# Patient Record
Sex: MALE | Race: OTHER | HISPANIC OR LATINO | Employment: FULL TIME | URBAN - METROPOLITAN AREA
[De-identification: names, ages, dates, MRNs, and addresses within clinical notes are randomized per-mention and may not be internally consistent; named-entity substitution may affect disease eponyms.]

---

## 2023-06-07 ENCOUNTER — APPOINTMENT (EMERGENCY)
Dept: RADIOLOGY | Facility: HOSPITAL | Age: 53
End: 2023-06-07
Payer: OTHER MISCELLANEOUS

## 2023-06-07 ENCOUNTER — APPOINTMENT (EMERGENCY)
Dept: NON INVASIVE DIAGNOSTICS | Facility: HOSPITAL | Age: 53
End: 2023-06-07
Payer: OTHER MISCELLANEOUS

## 2023-06-07 ENCOUNTER — HOSPITAL ENCOUNTER (EMERGENCY)
Facility: HOSPITAL | Age: 53
Discharge: HOME | End: 2023-06-07
Attending: EMERGENCY MEDICINE
Payer: OTHER MISCELLANEOUS

## 2023-06-07 VITALS
SYSTOLIC BLOOD PRESSURE: 148 MMHG | DIASTOLIC BLOOD PRESSURE: 91 MMHG | TEMPERATURE: 98.2 F | RESPIRATION RATE: 18 BRPM | HEART RATE: 84 BPM | OXYGEN SATURATION: 98 %

## 2023-06-07 DIAGNOSIS — S80.811A ABRASION, RIGHT LOWER LEG, INITIAL ENCOUNTER: ICD-10-CM

## 2023-06-07 DIAGNOSIS — S80.11XA CONTUSION OF RIGHT LOWER LEG, INITIAL ENCOUNTER: Primary | ICD-10-CM

## 2023-06-07 PROCEDURE — 93971 EXTREMITY STUDY: CPT | Performed by: SURGERY

## 2023-06-07 PROCEDURE — 73590 X-RAY EXAM OF LOWER LEG: CPT

## 2023-06-07 PROCEDURE — 90715 TDAP VACCINE 7 YRS/> IM: CPT | Performed by: EMERGENCY MEDICINE

## 2023-06-07 PROCEDURE — 73564 X-RAY EXAM KNEE 4 OR MORE: CPT

## 2023-06-07 PROCEDURE — 93971 EXTREMITY STUDY: CPT

## 2023-06-07 RX ORDER — NAPROXEN 500 MG/1
500 TABLET ORAL EVERY 12 HOURS PRN
Qty: 15 TABLET | Refills: 0 | Status: SHIPPED | OUTPATIENT
Start: 2023-06-07

## 2023-06-07 RX ORDER — KETOROLAC TROMETHAMINE 30 MG/ML
30 INJECTION, SOLUTION INTRAMUSCULAR; INTRAVENOUS ONCE
Status: COMPLETED | OUTPATIENT
Start: 2023-06-07 | End: 2023-06-07

## 2023-06-07 RX ADMIN — TETANUS TOXOID, REDUCED DIPHTHERIA TOXOID AND ACELLULAR PERTUSSIS VACCINE, ADSORBED 0.5 ML: 5; 2.5; 8; 8; 2.5 SUSPENSION INTRAMUSCULAR at 13:24

## 2023-06-07 RX ADMIN — KETOROLAC TROMETHAMINE 30 MG: 30 INJECTION, SOLUTION INTRAMUSCULAR; INTRAVENOUS at 13:23

## 2023-06-07 NOTE — ED PROVIDER NOTES
History  Chief Complaint   Patient presents with   • Leg Pain     Worsening pain to RLE due to injury at work  No pain meds pta       64-year-old male with no past medical history presents to the ED with worsening pain to the right leg below the knee as well as swelling  Patient injured himself at work about 10 days ago  He states he lost his balance and his leg fell between 2 metal objects  He has been ambulatory with pain  He has been taking over-the-counter remedies without relief  Has noticed increased swelling of the calf over the last few days  History provided by:  Patient   used: No    Leg Pain  Location:  Leg  Time since incident:  10 days  Injury: yes    Mechanism of injury: fall    Fall:     Fall occurred:  Walking    Impact surface: Leg fell between 2 metal objects  Leg location:  R lower leg  Pain details:     Onset quality:  Gradual    Duration:  10 days    Timing:  Constant    Progression:  Worsening  Chronicity:  New  Tetanus status:  Unknown  Prior injury to area:  No  Worsened by:  Bearing weight  Ineffective treatments:  Acetaminophen and NSAIDs  Associated symptoms: swelling    Associated symptoms: no decreased ROM, no fever, no muscle weakness and no neck pain        None       Past Medical History:   Diagnosis Date   • [facility] ([facility]        History reviewed  No pertinent surgical history  History reviewed  No pertinent family history  I have reviewed and agree with the history as documented  E-Cigarette/Vaping     E-Cigarette/Vaping Substances     Social History     Tobacco Use   • Smoking status: Never   • Smokeless tobacco: Never   Substance Use Topics   • Alcohol use: Yes   • Drug use: Never       Review of Systems   Constitutional: Negative  Negative for fever  HENT: Negative  Eyes: Negative  Respiratory: Negative  Cardiovascular: Negative  Gastrointestinal: Negative  Genitourinary: Negative      Musculoskeletal: Negative for neck pain  Skin: Negative  Allergic/Immunologic: Negative  Neurological: Negative  Negative for weakness, numbness and headaches  Hematological: Negative  Psychiatric/Behavioral: Negative  All other systems reviewed and are negative  Physical Exam  Physical Exam  Vitals and nursing note reviewed  Constitutional:       General: He is awake  He is not in acute distress  Appearance: Normal appearance  He is well-developed and normal weight  He is not ill-appearing or diaphoretic  HENT:      Head: Normocephalic and atraumatic  Right Ear: External ear normal       Left Ear: External ear normal       Nose: Nose normal       Mouth/Throat:      Mouth: Mucous membranes are moist    Eyes:      General: No scleral icterus  Conjunctiva/sclera: Conjunctivae normal    Neck:      Thyroid: No thyromegaly  Vascular: No JVD  Cardiovascular:      Rate and Rhythm: Normal rate and regular rhythm  Heart sounds: Normal heart sounds  No murmur heard  No gallop  Pulmonary:      Effort: Pulmonary effort is normal  No respiratory distress  Breath sounds: Normal breath sounds  No stridor  No wheezing, rhonchi or rales  Musculoskeletal:         General: Swelling, tenderness and signs of injury present  No deformity  Normal range of motion  Right lower leg: No edema  Left lower leg: No edema  Legs:    Skin:     General: Skin is warm and dry  Coloration: Skin is not jaundiced or pale  Findings: No bruising, erythema, lesion or rash  Neurological:      General: No focal deficit present  Mental Status: He is alert and oriented to person, place, and time  Motor: No weakness  Deep Tendon Reflexes: Reflexes are normal and symmetric  Psychiatric:         Mood and Affect: Mood normal          Behavior: Behavior is cooperative           Vital Signs  ED Triage Vitals   Temperature Pulse Respirations Blood Pressure SpO2   06/07/23 1228 06/07/23 1228 06/07/23 1228 06/07/23 1228 06/07/23 1228   98 2 °F (36 8 °C) 76 18 169/88 98 %      Temp Source Heart Rate Source Patient Position - Orthostatic VS BP Location FiO2 (%)   06/07/23 1228 -- 06/07/23 1228 06/07/23 1228 --   Oral  Sitting Right arm       Pain Score       06/07/23 1323       5           Vitals:    06/07/23 1228   BP: 169/88   Pulse: 76   Patient Position - Orthostatic VS: Sitting         Visual Acuity      ED Medications  Medications   ketorolac (TORADOL) injection 30 mg (30 mg Intramuscular Given 6/7/23 1323)   tetanus-diphtheria-acellular pertussis (BOOSTRIX) IM injection 0 5 mL (0 5 mL Intramuscular Given 6/7/23 1324)       Diagnostic Studies  Results Reviewed     None                 XR tibia fibula 2 views RIGHT   ED Interpretation by Gianna Rodarte DO (06/07 1322)   No fracture      Final Result by Steve Shah MD (06/07 1512)      No acute osseous abnormality  Workstation performed: [id number]         XR knee 4+ views Right injury   ED Interpretation by Gianna Rodarte DO (06/07 1322)   Possible avulsion fracture      Final Result by Steve Shah MD (06/07 1512)      No acute osseous abnormality  Degenerative changes as described  Workstation performed: [id number]         VAS lower limb venous duplex study, unilateral/limited    (Results Pending)              Procedures  Procedures         ED Course                               SBIRT 20yo+    Flowsheet Row Most Recent Value   Initial Alcohol Screen: US AUDIT-C     1  How often do you have a drink containing alcohol? 1 Filed at: 06/07/2023 1330   2  How many drinks containing alcohol do you have on a typical day you are drinking? 1 Filed at: 06/07/2023 1330   3a  Male UNDER 65: How often do you have five or more drinks on one occasion? 0 Filed at: 06/07/2023 1330   3b  FEMALE Any Age, or MALE 65+: How often do you have 4 or more drinks on one occassion?  0 Filed at: 06/07/2023 1330   Audit-C Score 2 Filed at: 06/07/2023 8030   HERBIE: How many times in the past year have you    Used an illegal drug or used a prescription medication for non-medical reasons? Never Filed at: 06/07/2023 5940                    Medical Decision Making  52-year-old male presents to the ED with worsening pain and swelling to the right lower extremity below the knee  Patient had an injury at work 10 days ago where he lost his balance and his leg slipped between 2 metal objects  He states he has been ambulatory with pain but the pain is getting worse as well as the swelling  On exam he is alert no acute distress  He has healing abrasions to the anterior right leg below the knee with no signs of infection  He does have swelling of the right calf compared to the left  He has tenderness over the calf as well as over the tibia and knee  No knee effusion  Will obtain x-rays of the right knee and right tib-fib as well as venous duplex of the right lower extremity to rule out DVT  We will also update tetanus and provide Toradol for pain    Amount and/or Complexity of Data Reviewed  Radiology: ordered and independent interpretation performed  Details: X-ray right knee: Possible tiny avulsion fracture possibly from tibial plateau  Awaiting final read  Venous duplex negative for DVT  Final read on x-ray of right knee reveals no fracture      Risk  Prescription drug management            Disposition  Final diagnoses:   Contusion of right lower leg, initial encounter   Abrasion, right lower leg, initial encounter     Time reflects when diagnosis was documented in both MDM as applicable and the Disposition within this note     Time User Action Codes Description Comment    6/7/2023  3:35 PM Lissett Cronin Add [S80 11XA] Contusion of right lower leg, initial encounter     6/7/2023  3:35 PM Lissett Cronin Add [S80 811A] Abrasion, right lower leg, initial encounter       ED Disposition     ED Disposition   Discharge Condition   Good    Date/Time   Wed Jun 7, 2023  3:35 PM    [address]senia Allen Said discharge to home/self care  Follow-up Information     Follow up With Specialties Details Why Contact Info Additional [address] [facility] Schedule an appointment as soon as possible for a visit in 2 days If symptoms worsen [address], [address] [facility] [id number]  [address], [address], [address] [place], [place], [address]          Patient's Medications   Discharge Prescriptions    NAPROXEN (NAPROSYN) 500 MG TABLET    Take 1 tablet (500 mg total) by mouth every 12 (twelve) hours as needed for moderate pain or mild pain       Start Date: 6/7/2023  End Date: --       Order Dose: 500 mg       Quantity: 15 tablet    Refills: 0       No discharge procedures on file      PDMP Review     None          ED Provider  Electronically Signed by           Chery Henderson DO  06/07/23 4461

## 2023-06-07 NOTE — Clinical Note
Terrence Mccray was seen and treated in our emergency department on 6/7/2023  No restrictions            Diagnosis:     Yessi Naqvi  may return to work on return date  He may return on this date: 06/10/2023         If you have any questions or concerns, please don't hesitate to call        Kailyn Whittington DO    ______________________________           _______________          _______________  Hospital Representative                              Date                                Time

## 2023-06-14 ENCOUNTER — APPOINTMENT (OUTPATIENT)
Dept: URGENT CARE | Facility: MEDICAL CENTER | Age: 53
End: 2023-06-14
Payer: OTHER MISCELLANEOUS

## 2023-06-14 PROCEDURE — 99214 OFFICE O/P EST MOD 30 MIN: CPT

## 2023-07-24 ENCOUNTER — APPOINTMENT (OUTPATIENT)
Dept: URGENT CARE | Facility: MEDICAL CENTER | Age: 53
End: 2023-07-24
Payer: OTHER MISCELLANEOUS

## 2023-07-24 PROCEDURE — 99213 OFFICE O/P EST LOW 20 MIN: CPT | Performed by: FAMILY MEDICINE

## 2023-08-07 ENCOUNTER — EVALUATION (OUTPATIENT)
Dept: PHYSICAL THERAPY | Facility: MEDICAL CENTER | Age: 53
End: 2023-08-07
Payer: OTHER MISCELLANEOUS

## 2023-08-07 DIAGNOSIS — M25.571 ACUTE RIGHT ANKLE PAIN: Primary | ICD-10-CM

## 2023-08-07 PROCEDURE — 97161 PT EVAL LOW COMPLEX 20 MIN: CPT | Performed by: PHYSICAL THERAPIST

## 2023-08-08 NOTE — PROGRESS NOTES
PT Evaluation     Today's date: 2023  Patient name: Lucy Siddiqui  : 1970  MRN: [id number]  Referring provider: Luz Maria Munroe MD  Dx:   Encounter Diagnosis     ICD-10-CM    1. Acute right ankle pain  M25.571                      Assessment  Assessment details: Lucy Siddiqui is a 47 y.o. male was evaluated on 2023  for Acute right ankle pain  (primary encounter diagnosis). Lucy Siddiqui has the above listed impairments resulting in functional deficits and negative impact to quality of life. Patient is appropriate for skilled PT intervention to promote maximal return to function and patient specific goals. Patient agrees with outlined treatment plan and all questions were answered to their satisfaction. Impairments: abnormal muscle firing, abnormal muscle tone, abnormal or restricted ROM, impaired physical strength, lacks appropriate home exercise program and pain with function  Understanding of Dx/Px/POC: good   Prognosis: good    Goals  Patient will successfully transition to home exercise program.  Patient will be able to manage symptoms independently. Hilaryturner Howellrosa mariafatou will report no limitation in walking tolerance of 15 minutes or more  Gerdys will be able to stand for an hour without limitation     Plan  Patient would benefit from: skilled PT  Referral necessary: No  Planned modality interventions: thermotherapy: hydrocollator packs  Planned therapy interventions: home exercise program, manual therapy, neuromuscular re-education, patient education, functional ROM exercises, strengthening, stretching, joint mobilization, graded activity, graded exercise, therapeutic exercise, body mechanics training, motor coordination training and activity modification  Frequency: 1x week  Duration in weeks: 12  Treatment plan discussed with: patient        Subjective Evaluation    History of Present Illness  Mechanism of injury: Lucy Siddiqui is a 47 y.o. Jazmyn Verma male presenting to therapy with right lower leg/ankle pain after having his foot fall in between two metal objects at work. He notes that it was a struggle to get his ankle out of the tight space. Had imaging performed at ER and was negative for fracture. He currently is wearing no bracing. Notes that he is limited in walking due to swelling and pain that begin with increased time on his feet. He has not returned to work as it requires a good deal of standing and walking. Doppler for DVT (-)     Patient Goals  Patient goals for therapy: decreased pain, increased motion, return to work, return to [employer] activities, independence with ADLs/IADLs and increased strength    Pain  Current pain rating: 3  At best pain ratin  At worst pain ratin  Quality: dull ache, tight, squeezing and discomfort          Objective     Observations     Additional Observation Details  Mild swelling in R lower ankle, no pitting edema        Palpation     Right   Tenderness of the medial gastrocnemius and soleus. Trigger point to medial gastrocnemius and soleus. Additional Palpation Details  Mild bruising to medial gastroc     Neurological Testing     Sensation     Ankle/Foot   Left Ankle/Foot   Intact: light touch    Right Ankle/Foot   Intact: light touch     Active Range of Motion   Left Ankle/Foot   Normal active range of motion    Right Ankle/Foot   Normal active range of motion    Joint Play     Right Ankle/Foot  Hypomobile in the talocrural joint and subtalar joint. Strength/Myotome Testing     Left Ankle/Foot   Dorsiflexion: 4+  Plantar flexion: 4+  Inversion: 4+  Eversion: 4+    Right Ankle/Foot   Dorsiflexion: 4  Plantar flexion: 4  Inversion: 4  Eversion: 4    Tests     Right Ankle/Foot   Negative for Homans' sign, syndesmosis squeeze and Biggs.               Precautions: None      Manuals                                                                 Neuro Re-Ed Ther Ex             gastroc stretch             Ankle ABC             Ankle circles             Ankle plantar flexion Black                                                                  Ther Activity                                       Gait Training                                       Modalities

## 2023-08-14 ENCOUNTER — APPOINTMENT (OUTPATIENT)
Dept: URGENT CARE | Facility: MEDICAL CENTER | Age: 53
End: 2023-08-14
Payer: OTHER MISCELLANEOUS

## 2023-08-14 PROCEDURE — 99213 OFFICE O/P EST LOW 20 MIN: CPT | Performed by: FAMILY MEDICINE

## 2023-08-25 ENCOUNTER — OFFICE VISIT (OUTPATIENT)
Dept: PHYSICAL THERAPY | Facility: MEDICAL CENTER | Age: 53
End: 2023-08-25
Payer: OTHER MISCELLANEOUS

## 2023-08-25 DIAGNOSIS — M25.571 ACUTE RIGHT ANKLE PAIN: Primary | ICD-10-CM

## 2023-08-25 PROCEDURE — 97140 MANUAL THERAPY 1/> REGIONS: CPT | Performed by: PHYSICAL THERAPIST

## 2023-08-25 PROCEDURE — 97110 THERAPEUTIC EXERCISES: CPT | Performed by: PHYSICAL THERAPIST

## 2023-08-25 NOTE — PROGRESS NOTES
Daily Note     Today's date: 2023  Patient name: Annalisa Santamaria  : 1970  MRN: [id number]  Referring provider: Wu Abel MD  Dx:   Encounter Diagnosis     ICD-10-CM    1. Acute right ankle pain  M25.571                      Subjective: Yvrose reports that he is doing better, less swelling in calf       Objective: See treatment diary below      Assessment: Tolerated treatment well. Patient exhibited good technique with therapeutic exercises and would benefit from continued PT      Plan: Continue per plan of care.       Precautions: None      Manuals                                                                 Neuro Re-Ed                                                                                                        Ther Ex             gastroc stretch 30 sec   X 3             Ankle ABC 3            Ankle circles 30            Ankle plantar flexion Black  30              SAQ 5#  30            Hamstring stretch 30 sec X 3             Standing heel raise 30                         Ther Activity                                       Gait Training                                       Modalities

## 2023-08-28 ENCOUNTER — APPOINTMENT (OUTPATIENT)
Dept: URGENT CARE | Facility: MEDICAL CENTER | Age: 53
End: 2023-08-28
Payer: OTHER MISCELLANEOUS

## 2023-08-28 PROCEDURE — 99213 OFFICE O/P EST LOW 20 MIN: CPT

## 2023-08-30 ENCOUNTER — OFFICE VISIT (OUTPATIENT)
Dept: PHYSICAL THERAPY | Facility: MEDICAL CENTER | Age: 53
End: 2023-08-30
Payer: OTHER MISCELLANEOUS

## 2023-08-30 DIAGNOSIS — M25.571 ACUTE RIGHT ANKLE PAIN: Primary | ICD-10-CM

## 2023-08-30 PROCEDURE — 97140 MANUAL THERAPY 1/> REGIONS: CPT | Performed by: PHYSICAL THERAPIST

## 2023-08-30 PROCEDURE — 97110 THERAPEUTIC EXERCISES: CPT | Performed by: PHYSICAL THERAPIST

## 2023-08-30 NOTE — PROGRESS NOTES
Daily Note     Today's date: 2023  Patient name: Desiree Apodaca  : 1970  MRN: [id number]  Referring provider: Annamaria Li MD  Dx:   Encounter Diagnosis     ICD-10-CM    1. Acute right ankle pain  M25.571                      Subjective: Rahulys reports that he is doing better, less swelling in calf       Objective: See treatment diary below      Assessment: Tolerated treatment well. Patient exhibited good technique with therapeutic exercises and would benefit from continued PT      Plan: Continue per plan of care.       Precautions: None      Manuals                                                                 Neuro Re-Ed                                                                                                        Ther Ex             gastroc stretch 30 sec   X 3             Ankle ABC 3            Ankle circles 30            Ankle plantar flexion Black  30              SAQ 5#  30            Hamstring stretch 30 sec X 3             Standing heel raise 30            Squats 30            Ther Activity                                       Gait Training                                       Modalities

## 2023-09-06 ENCOUNTER — OFFICE VISIT (OUTPATIENT)
Dept: PHYSICAL THERAPY | Facility: MEDICAL CENTER | Age: 53
End: 2023-09-06
Payer: OTHER MISCELLANEOUS

## 2023-09-06 DIAGNOSIS — M25.571 ACUTE RIGHT ANKLE PAIN: Primary | ICD-10-CM

## 2023-09-06 PROCEDURE — 97110 THERAPEUTIC EXERCISES: CPT | Performed by: PHYSICAL THERAPIST

## 2023-09-06 PROCEDURE — 97140 MANUAL THERAPY 1/> REGIONS: CPT | Performed by: PHYSICAL THERAPIST

## 2023-09-06 NOTE — PROGRESS NOTES
Daily Note     Today's date: 2023  Patient name: Kimberly Gordon  : 1970  MRN: [id number]  Referring provider: Gillian Israel MD  Dx:   Encounter Diagnosis     ICD-10-CM    1. Acute right ankle pain  M25.571                      Subjective: Yvrose reports that he is doing better, less swelling in calf       Objective: See treatment diary below      Assessment: Tolerated treatment well. Patient exhibited good technique with therapeutic exercises and would benefit from continued PT  Continue progression of functional tolerance and return to work activity       Plan: Continue per plan of care.       Precautions: None      Manuals                                                                 Neuro Re-Ed                                                                                                        Ther Ex             gastroc stretch 30 sec   X 3             Ankle ABC 3            Ankle circles 30            Ankle plantar flexion Black  30              SAQ 5#  30            Hamstring stretch 30 sec X 3             Standing heel raise 30            Squats 30            Ther Activity                                       Gait Training                                       Modalities

## 2023-09-11 ENCOUNTER — APPOINTMENT (OUTPATIENT)
Dept: URGENT CARE | Facility: MEDICAL CENTER | Age: 53
End: 2023-09-11
Payer: OTHER MISCELLANEOUS

## 2023-09-11 PROCEDURE — 99213 OFFICE O/P EST LOW 20 MIN: CPT

## 2023-09-13 ENCOUNTER — OFFICE VISIT (OUTPATIENT)
Dept: PHYSICAL THERAPY | Facility: MEDICAL CENTER | Age: 53
End: 2023-09-13
Payer: OTHER MISCELLANEOUS

## 2023-09-13 DIAGNOSIS — M25.571 ACUTE RIGHT ANKLE PAIN: Primary | ICD-10-CM

## 2023-09-13 PROCEDURE — 97140 MANUAL THERAPY 1/> REGIONS: CPT | Performed by: PHYSICAL THERAPIST

## 2023-09-13 PROCEDURE — 97110 THERAPEUTIC EXERCISES: CPT | Performed by: PHYSICAL THERAPIST

## 2023-09-13 NOTE — PROGRESS NOTES
Daily Note     Today's date: 2023  Patient name: Elen Rosas  : 1970  MRN: [id number]  Referring provider: Alan Galeana MD  Dx:   Encounter Diagnosis     ICD-10-CM    1. Acute right ankle pain  M25.571                      Subjective: Rahulys reports that he is doing better, less swelling in calf       Objective: See treatment diary below      Assessment: Tolerated treatment well. Patient exhibited good technique with therapeutic exercises and would benefit from continued PT  Continue progression of functional tolerance and return to work activity   Good tolerance to sled pushing today without issue      Plan: Continue per plan of care.       Precautions: None      Manuals                                                                 Neuro Re-Ed                                                                                                        Ther Ex             gastroc stretch 30 sec   X 3             Ankle ABC 3            Ankle circles 30            Ankle plantar flexion Black  30              SAQ 5#  30            Hamstring stretch 30 sec X 3             Sled push 100#  5 laps             Standing heel raise 30            Squats 30            Ther Activity                                       Gait Training                                       Modalities

## 2023-09-18 ENCOUNTER — APPOINTMENT (OUTPATIENT)
Dept: URGENT CARE | Facility: MEDICAL CENTER | Age: 53
End: 2023-09-18
Payer: OTHER MISCELLANEOUS

## 2023-09-18 PROCEDURE — 99213 OFFICE O/P EST LOW 20 MIN: CPT

## 2023-09-21 VITALS
DIASTOLIC BLOOD PRESSURE: 80 MMHG | WEIGHT: 225 LBS | SYSTOLIC BLOOD PRESSURE: 120 MMHG | HEIGHT: 70 IN | BODY MASS INDEX: 32.21 KG/M2

## 2023-09-21 DIAGNOSIS — T14.90XA INJURY: ICD-10-CM

## 2023-09-21 DIAGNOSIS — T14.8XXA SUBCUTANEOUS HEMATOMA: Primary | ICD-10-CM

## 2023-09-21 DIAGNOSIS — M22.41 PATELLA, CHONDROMALACIA, RIGHT: ICD-10-CM

## 2023-09-21 DIAGNOSIS — M25.561 RIGHT KNEE PAIN, UNSPECIFIED CHRONICITY: ICD-10-CM

## 2023-09-21 PROCEDURE — 99203 OFFICE O/P NEW LOW 30 MIN: CPT | Performed by: FAMILY MEDICINE

## 2023-09-21 RX ORDER — NAPROXEN 375 MG/1
375 TABLET, DELAYED RELEASE ORAL 2 TIMES DAILY WITH MEALS
Qty: 14 TABLET | Refills: 0 | Status: SHIPPED | OUTPATIENT
Start: 2023-09-21 | End: 2023-09-28

## 2023-09-21 RX ORDER — METFORMIN HYDROCHLORIDE 750 MG/1
750 TABLET, EXTENDED RELEASE ORAL
COMMUNITY

## 2023-09-21 NOTE — PROGRESS NOTES
Assessment:     1. Subcutaneous hematoma  Ambulatory Referral to Physical Therapy      2. Right knee pain, unspecified chronicity  Ambulatory Referral to Physical Therapy      3. Injury  Ambulatory Referral to Physical Therapy      4. Patella, chondromalacia, right  Ambulatory Referral to Physical Therapy        Orders Placed This Encounter   Procedures   • Ambulatory Referral to Physical Therapy        Impression:   Knee pain likely secondary to resolving subcutaneous hematoma and mild patellar chondromalacia. Workmen's Compensation  Date of injury 10 days prior to 06/07/2023  Follow-up from injury 15 weeks and 1 day     #[id number] and #[id number]    Conservative Management   We discussed different treatment options:  Reviewed emergency documentation from 06/07/2023 for right lower leg contusion. X-rays were taken of the knee, tib-fib. Vascular lower limb venous duplex study was completed. Given ketorolac, naproxen. Given for Tdap. No acute DVT. Reviewed formal physical therapy documentation from 08/07/2023. Physical therapy was completed for right ankle pain. • Ice or Heat Therapy as needed 1-2 times daily for 10-20 minutes. As tolerated. • Trial of over the counter Topical Analgesics such as Lidocaine cream or Voltaren Gel, as tolerated. If skin becomes irritated, discontinue use. • Will trial naproxen, prescription given and compression. • Please range joint through gentle range of motion as tolerated. Handout provided today. • Initiate Formal Physical Therapy at any preferred location. Prescription provided  • No work note needed. Imaging   • All imaging from today was reviewed by myself and explained to the patient. • Able to load CD ROM of MRI of right knee. • Impressions from radiology  1. diffuse subcutaneous edema and suspected resolving medial hematoma  2. Meniscal or cruciate ligament abnormality.   3.  Mild patellar chondromalacia    Procedure  • Not appropriate at this time.     Shared decision making, patient agreeable to plan. Return for Follow up as needed or if symptoms do NOT improve. HPI:   Edmundo Villalobos is a 47 y.o. male  who presents for evaluation of   Chief Complaint   Patient presents with   • Right Knee - Pain       Injury yes  Workmen's Compensation. 10 days prior to 06/07/2023 date of injury  Patient has been back to work with no limitations. 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 His leg was trapped in between a trailer and ramp. Patient was injured and then waited 10 days prior to being evaluated in the emergency department. Patient initially declined EMT at the moment of accident. Patient received days later had swelling. Location: Medial knee. Severity: Current severity: 2/10. Max severity: 4/10. Pain described as:   Radiation: Radiates down into the lower leg. Provocative: Walking. Associated symptoms: Swelling. Denies any hx of fracture of affected limb. , Denies any surgical history of affected limb. Summary of treatment to-date:   • Formal physical therapy for medical, 5 sessions  • Ibuprofen  • NSAIDS     Following History Reviewed and Updated     Past Medical History:   Diagnosis Date   • Diabetes mellitus ([address])      History reviewed. No pertinent surgical history. History reviewed. No pertinent family history.     Social History     Substance and Sexual Activity   Alcohol Use Yes     Social History     Substance and Sexual Activity   Drug Use Never     Social History     Tobacco Use   Smoking Status Never   Smokeless Tobacco Never       Social Determinants of Health     Tobacco Use: Low Risk  (9/21/2023)    Patient History    • Smoking Tobacco Use: Never    • Smokeless Tobacco Use: Never    • Passive Exposure: Not on file   Alcohol Use: Not on file   Financial Resource Strain: Not on file   Food Insecurity: Not on file   Transportation Needs: Not on file   Physical Activity: Not on file   Stress: Not on file   Social Connections: Not on file   Intimate Partner Violence: Not on file   Depression: Not on file   Housing Stability: Not on file   Utilities: Not on file        No Known Allergies    Review of Systems      Review of Systems   Review of Systems   Constitutional: Negative for chills and fever. HENT: Negative for drooling and sneezing. Eyes: Negative for redness. Respiratory: Negative for cough and wheezing. Gastrointestinal: Negative for vomiting. Psychiatric/Behavioral: Negative for behavioral problems. The patient is not nervous/anxious. All other systems negative. Physical Exam   Physical Exam    Vitals and nursing note reviewed. Constitutional:   Appearance. Normal Appearance. /80   Ht 5' 10" (1.778 m)   Wt 102 kg (225 lb)   BMI 32.28 kg/m²     Body mass index is 32.28 kg/m². HENT:  Head: Atraumatic. Nose: Nose normal  Eyes: Conjunctiva/sclera: Conjunctivae normal.  Cardiovascular:   Rate and Rhythm: Bilateral equal distal pulses  Pulmonary:   Effort: Pulmonary effort is normal  Skin:   General: Skin is warm and dry. Neurological:   General: No focal deficit present. Mental Status: Alert and oriented to person, place, and time. Psychiatric:   Mood and Affect: mood normal.  Behavior: Behavior normal     Musculoskeletal Exam     Ortho Exam   Knee: Inspection:  Erythema Bruising Effusion Muscle atrophy Retracted muscle   Negative negative negative Negative Negative     Palpation:  Increased warmth Crepitus Palpable muscle depression   Negative negative Negative     Tenderness:  Soft tissue around the medial femoral condyle  Quadriceps tendon Patella Patellar tendon Medial joint line Tibial tubercle Pes anserine bursa Posterior knee   Neg. Neg. Neg. Neg. Neg. Neg. Neg.      Sofi's tubercle Biceps femoris Semimembranosus/semitendinosis Popliteus tendon Fibular head   Neg. Neg. Neg. Neg. Neg.        Bilateral Range of Motion:  Active flexion Passive flexion   (normal 135 degrees) (normal 135 degrees)   intact intact     Active extension Passive extension   (normal 0 degrees) (normal 0 degrees)   intact intact       Bilateral strength:  Seated hip flexion Seated hip adduction Seated hip abduction Seated knee flexion Seated knee extension   5/5 5/5 5/5 5/5 5/5     Seated great toe extension Seated ankle dorsiflexion Seated ankle plantarflexion   5/5 5/5 5/5       Patella:  Patellofemoral tracking  Patellar Displacement Patellar Tilt Patellar Apprehension Patellar Grind Graff's   observing the patella for smooth motion while the patient contracts the quadriceps muscle       normal and symmetrical   normal and symmetrical normal and symmetrical Mild discomfort Negative       Ligament testing:  Anterior cruciate ligament (ACL)  Posterior cruciate ligament (PCL) Medial Collateral Ligament (MCL)  Lateral Collateral Ligament (LCL):   Lachman's Posterior drawer's Valgus Varus   Negative for laxity Negative for laxity Negative for laxity Negative for laxity     Meniscal testing:  Medial Talha Lateral Talha Apley's Thessaly's Bounce home test   Negative Negative N/A N/A N/A     Neurovascular:  Sensation to light touch Posterior tibial artery   Intact and equal bilaterally Intact and equal bilaterally            Procedures       Portions of the record may have been created with voice recognition software. Occasional wrong word or "sound alike" substitutions may have occurred due to the inherent limitations of voice recognition software. Please review the chart carefully and recognize, using context, where substitutions/typographical errors may have occurred.

## 2023-09-27 ENCOUNTER — OFFICE VISIT (OUTPATIENT)
Dept: PHYSICAL THERAPY | Facility: MEDICAL CENTER | Age: 53
End: 2023-09-27
Payer: OTHER MISCELLANEOUS

## 2023-09-27 DIAGNOSIS — M25.571 ACUTE RIGHT ANKLE PAIN: Primary | ICD-10-CM

## 2023-09-27 PROCEDURE — 97110 THERAPEUTIC EXERCISES: CPT | Performed by: PHYSICAL THERAPIST

## 2023-09-27 NOTE — PROGRESS NOTES
Daily Note     Today's date: 2023  Patient name: Scott Kapoor  : 1970  MRN: [id number]  Referring provider: Graciela Dean MD  Dx:   Encounter Diagnosis     ICD-10-CM    1. Acute right ankle pain  M25.571                      Subjective: Cheng Gold reports that he is doing better, he has returned to work, still notes lower leg swelling at end of work day       Objective: See treatment diary below      Assessment: Tolerated treatment well. Patient is showing good lower extremity strength and mobility. Unable to alter swelling at work however, he will follow up with ortho and follow up as directed       Plan: Continue per plan of care.       Precautions: None      Manuals                                                                 Neuro Re-Ed                                                                                                        Ther Ex             gastroc stretch 30 sec   X 3             Ankle ABC 3            Ankle circles 30            Ankle plantar flexion Black  30              SAQ 5#  30            Hamstring stretch 30 sec X 3             Sled push 100#  5 laps             Standing heel raise 30            Squats 30            Ther Activity                                       Gait Training                                       Modalities

## 2023-09-29 ENCOUNTER — OFFICE VISIT (OUTPATIENT)
Dept: OBGYN CLINIC | Facility: CLINIC | Age: 53
End: 2023-09-29
Payer: OTHER MISCELLANEOUS

## 2023-09-29 VITALS
BODY MASS INDEX: 32.21 KG/M2 | DIASTOLIC BLOOD PRESSURE: 70 MMHG | WEIGHT: 225 LBS | SYSTOLIC BLOOD PRESSURE: 130 MMHG | HEIGHT: 70 IN

## 2023-09-29 DIAGNOSIS — M75.32 CALCIFIC TENDONITIS OF LEFT SHOULDER: Primary | ICD-10-CM

## 2023-09-29 PROCEDURE — 20610 DRAIN/INJ JOINT/BURSA W/O US: CPT | Performed by: PHYSICIAN ASSISTANT

## 2023-09-29 PROCEDURE — 99214 OFFICE O/P EST MOD 30 MIN: CPT | Performed by: PHYSICIAN ASSISTANT

## 2023-09-29 RX ORDER — LIDOCAINE HYDROCHLORIDE 10 MG/ML
2 INJECTION, SOLUTION INFILTRATION; PERINEURAL
Status: COMPLETED | OUTPATIENT
Start: 2023-09-29 | End: 2023-09-29

## 2023-09-29 RX ORDER — BUPIVACAINE HYDROCHLORIDE 2.5 MG/ML
2 INJECTION, SOLUTION INFILTRATION; PERINEURAL
Status: COMPLETED | OUTPATIENT
Start: 2023-09-29 | End: 2023-09-29

## 2023-09-29 RX ORDER — TRIAMCINOLONE ACETONIDE 40 MG/ML
40 INJECTION, SUSPENSION INTRA-ARTICULAR; INTRAMUSCULAR
Status: COMPLETED | OUTPATIENT
Start: 2023-09-29 | End: 2023-09-29

## 2023-09-29 RX ADMIN — LIDOCAINE HYDROCHLORIDE 2 ML: 10 INJECTION, SOLUTION INFILTRATION; PERINEURAL at 11:00

## 2023-09-29 RX ADMIN — BUPIVACAINE HYDROCHLORIDE 2 ML: 2.5 INJECTION, SOLUTION INFILTRATION; PERINEURAL at 11:00

## 2023-09-29 RX ADMIN — TRIAMCINOLONE ACETONIDE 40 MG: 40 INJECTION, SUSPENSION INTRA-ARTICULAR; INTRAMUSCULAR at 11:00

## 2023-09-29 NOTE — PROGRESS NOTES
Patient Name:  Edmundo Villalobos  MRN:  [id number]    Assessment & Plan     Left shoulder calcific tendinitis/bursitis  1. Corticosteroid injection performed today into the left shoulder subacromial space. 2. Referral to physical therapy. 3. Activities as tolerated modification avoid pain. 4. Continue over-the-counter anti-inflammatories. 5. Follow-up in 4 to 6 weeks with primary care sports medicine.  utilized during this encounter    Chief Complaint     Left shoulder pain    History of the Present Illness     Edmundo Villalobos is a 47 y.o. right-hand-dominant male who reports to the office today for evaluation of his left shoulder. He notes an onset of pain approximately 2 months ago. Patient sustained an injury at work on 6/5/2023 which resulted in a right knee injury. He denies injuring his left shoulder at that time but developed left shoulder pain approximately 3 weeks after the work injury that involved his knee. Upon returning to work he noted worsening pain in the left shoulder. He he notes pain localized primarily to the lateral and anterior aspect of the shoulder. Pain is worse with reaching overhead behind his back as well as repetitive use and lifting. He notes weakness due to the pain. He denies any instability. He does not note occasional radiation of the pain distally towards the elbow. He also notes increased pain with lying on his left side at night. He denies any numbness and tingling or any cervical spine pain. He has been taking ibuprofen with improvement. He does have a history of diabetes and states his blood glucose levels are well controlled. Physical Exam     /70   Ht 5' 10" (1.778 m)   Wt 102 kg (225 lb)   BMI 32.28 kg/m²     Left shoulder: No gross deformity. There is tenderness anterior shoulder and lateral shoulder. No tenderness posterior shoulder and AC joint. PROM is , ER-abd 90, IR-abd 50.   Range of motion limited by pain and guarding. Impingement signs are positive. Empty can test is positive. Speed's Test is positive. Cross-body adduction test is negative. 5 out of 5 forward flexion strength. Sensation intact axillary, median, ulnar and radial nerves. 2+ radial pulse. Eyes: Anicteric sclerae. ENT: Trachea midline. Lungs: Normal respiratory effort. CV: Capillary refill is less than 2 seconds. Skin: Intact without erythema. Lymph: No palpable lymphadenopathy. Neuro: Sensation is grossly intact to light touch. Psych: Mood and affect are appropriate. Data Review     I have personally reviewed pertinent films in PACS, and my interpretation follows:    X-rays left shoulder 9/29/2023: No acute osseous abnormality. No fracture or dislocation. No significant degenerative. Calcific density adjacent to the greater tuberosity suggestive of calcific tendinitis. Past Medical History:   Diagnosis Date   • Diabetes mellitus ([address])        History reviewed. No pertinent surgical history. No Known Allergies    Current Outpatient Medications on File Prior to Visit   Medication Sig Dispense Refill   • metFORMIN (GLUCOPHAGE-XR) 750 mg 24 hr tablet Take 750 mg by mouth daily with breakfast     • naproxen (EC NAPROSYN) 375 MG TBEC Take 1 tablet (375 mg total) by mouth 2 (two) times a day with meals for 7 days 14 tablet 0     No current facility-administered medications on file prior to visit. Social History     Tobacco Use   • Smoking status: Never   • Smokeless tobacco: Never   Substance Use Topics   • Alcohol use: Yes   • Drug use: Never       History reviewed. No pertinent family history. Review of Systems     As stated in the HPI. All other systems reviewed and are negative.       Large joint arthrocentesis: L subacromial bursa  Procedure Details  Location: shoulder - L subacromial bursa  Needle size: 22 G  Ultrasound guidance: no  Approach: posterior  Medications administered: 2 mL bupivacaine 0.25 %; 2 mL lidocaine 1 %; 40 mg triamcinolone acetonide 40 mg/mL    Patient tolerance: patient tolerated the procedure well with no immediate complications  Dressing:  Sterile dressing applied

## 2023-11-01 ENCOUNTER — OFFICE VISIT (OUTPATIENT)
Dept: OBGYN CLINIC | Facility: CLINIC | Age: 53
End: 2023-11-01

## 2023-11-01 VITALS
SYSTOLIC BLOOD PRESSURE: 128 MMHG | WEIGHT: 228 LBS | HEIGHT: 70 IN | DIASTOLIC BLOOD PRESSURE: 68 MMHG | BODY MASS INDEX: 32.64 KG/M2

## 2023-11-01 DIAGNOSIS — M75.42 IMPINGEMENT SYNDROME OF LEFT SHOULDER: Primary | ICD-10-CM

## 2023-11-01 RX ORDER — MELOXICAM 15 MG/1
15 TABLET ORAL DAILY
Qty: 30 TABLET | Refills: 0 | Status: SHIPPED | OUTPATIENT
Start: 2023-11-01

## 2023-11-01 NOTE — PROGRESS NOTES
Patient Name:  Shay Rojo  MRN:  [id number]    Assessment & Plan     Left shoulder rotator cuff tendinitis/bursitis. Patient does report approximately 50% improvement after receiving the subacromial corticosteroid injection on 9/29/2023. Patient was unable to participate in formal physical therapy due to a family emergency which required him to travel back to the [facility]. New referral placed for physical therapy today. Prescription provided for meloxicam.  Activities as tolerated modification avoid pain. Follow-up in 6 weeks. Consider MRI if symptoms persist.    Chief Complaint     Follow-up left shoulder pain. History of the Present Illness     Shay Rojo is a 47 y.o. male who returns to the office today for follow-up regarding his left shoulder. He was initially seen on 9/29/2023. At that time he received a corticosteroid injection into the left shoulder subacromial space and was also referred to physical therapy. He returns to the office today noting 50% improvement in his symptoms. He still notes persistent discomfort in the left shoulder localized to the anterior and lateral and occasionally the posterior aspect of the shoulder. Pain is worse with increased use and sudden movements. 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 He states he was unable to participate in formal physical therapy due to a family emergency which required him to travel back to the [facility]. He has been taking ibuprofen as well. He denies any radiation of his pain. He denies any weakness or instability. He denies any numbness and tingling. No fevers or chills. Patient has been working full duty without significant difficulty. Physical Exam     /68   Ht 5' 10" (1.778 m)   Wt 103 kg (228 lb)   BMI 32.71 kg/m²     Left shoulder: No gross deformity. No significant tenderness anterior shoulder, lateral shoulder, posterior shoulder, and AC joint. PROM is , ER-abd 90, IR-abd 50. Range of motion limited by pain and guarding. Impingement signs are positive empty can test is mildly positive. Speed's Test is mildly positive as well. Cross-body adduction test is negative. 5 out of 5 forward flexion strength. Sensation intact axillary, median, ulnar and radial nerves. 2+ radial pulse. Eyes: Anicteric sclerae. ENT: Trachea midline. Lungs: Normal respiratory effort. CV: Capillary refill is less than 2 seconds. Skin: Intact without erythema. Lymph: No palpable lymphadenopathy. Neuro: Sensation is grossly intact to light touch. Psych: Mood and affect are appropriate. Past Medical History:   Diagnosis Date    Diabetes mellitus ([address])        History reviewed. No pertinent surgical history. No Known Allergies    Current Outpatient Medications on File Prior to Visit   Medication Sig Dispense Refill    metFORMIN (GLUCOPHAGE-XR) 750 mg 24 hr tablet Take 750 mg by mouth daily with breakfast      naproxen (EC NAPROSYN) 375 MG TBEC Take 1 tablet (375 mg total) by mouth 2 (two) times a day with meals for 7 days 14 tablet 0     No current facility-administered medications on file prior to visit. Social History     Tobacco Use    Smoking status: Never    Smokeless tobacco: Never   Substance Use Topics    Alcohol use: Yes    Drug use: Never       History reviewed. No pertinent family history. Review of Systems     As stated in the HPI. All other systems reviewed and are negative.

## 2023-12-13 ENCOUNTER — OFFICE VISIT (OUTPATIENT)
Dept: OBGYN CLINIC | Facility: CLINIC | Age: 53
End: 2023-12-13

## 2023-12-13 VITALS
BODY MASS INDEX: 31.35 KG/M2 | SYSTOLIC BLOOD PRESSURE: 120 MMHG | HEIGHT: 70 IN | WEIGHT: 219 LBS | DIASTOLIC BLOOD PRESSURE: 70 MMHG

## 2023-12-13 DIAGNOSIS — M24.812 INTERNAL DERANGEMENT OF LEFT SHOULDER: Primary | ICD-10-CM

## 2023-12-13 RX ORDER — MELOXICAM 15 MG/1
15 TABLET ORAL DAILY
Qty: 30 TABLET | Refills: 0 | Status: SHIPPED | OUTPATIENT
Start: 2023-12-13

## 2023-12-13 NOTE — PROGRESS NOTES
Patient Name:  Jai Kaplan  MRN:  [id number]    Assessment & Plan     Left shoulder pain and weakness, possible rotator cuff tear. Patient notes continued left shoulder pain and weakness despite conservative management including anti-inflammatories, subacromial corticosteroid injection, and activity modification. For this reason MRI of the left shoulder will be obtained for further evaluation. New prescription of meloxicam provided. Follow-up after MRI with one of our sports surgeons. Chief Complaint     Follow-up left shoulder    History of the Present Illness     Yvrose Gee is a 47 y.o. male who returns to the office today for follow-up regarding his left shoulder. He was last seen on 11/20/2023. At that time he reported 50% improvement after undergoing left shoulder subacromial space corticosteroid injection on 9/29/2023. At that time he was not able to participate in physical therapy due to a family emergency which required him to travel back to the [facility]. He returns to the office today noting persistent left shoulder pain. Pain is localized to the anterior and lateral aspect the shoulder. Pain is worse with all use and movement. 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 Pain is also worse at night while lying on his left side. He notes associated weakness. He states the pain radiates distally to the elbow. He has been taking ibuprofen with some improvement. He states he did not  the meloxicam prescription that was prescribed previously. He also states he did not participate in formal physical therapy due to his work schedule. He denies any numbness and tingling. No fevers or chills. He now also notes some right shoulder pain which is likely due to favoring the left side. Physical Exam     /70   Ht 5' 10" (1.778 m)   Wt 99.3 kg (219 lb)   BMI 31.42 kg/m²     Left shoulder: No gross deformity.   There is tenderness anterior shoulder and lateral shoulder. No tenderness posterior shoulder and AC joint. PROM is , ER-abd 90, IR-abd 50. Range of motion limited by pain and guarding. Impingement signs are positive. Empty can test is positive. Speed's Test is positive. Cross-body adduction test is negative. 5 out of 5 forward flexion strength. Sensation intact axillary, median, ulnar and radial nerves. 2+ radial pulse. Eyes: Anicteric sclerae. ENT: Trachea midline. Lungs: Normal respiratory effort. CV: Capillary refill is less than 2 seconds. Skin: Intact without erythema. Lymph: No palpable lymphadenopathy. Neuro: Sensation is grossly intact to light touch. Psych: Mood and affect are appropriate. Data Review     I have personally reviewed pertinent films in PACS, and my interpretation follows:    X-rays left shoulder 9/29/2023: No acute osseous abnormality. No fracture or dislocation. No significant degenerative. Calcific density adjacent to the greater tuberosity suggestive of calcific tendinitis. Past Medical History:   Diagnosis Date    Diabetes mellitus ([address])        History reviewed. No pertinent surgical history. No Known Allergies    Current Outpatient Medications on File Prior to Visit   Medication Sig Dispense Refill    meloxicam (Mobic) 15 mg tablet Take 1 tablet (15 mg total) by mouth daily 30 tablet 0    metFORMIN (GLUCOPHAGE-XR) 750 mg 24 hr tablet Take 750 mg by mouth daily with breakfast      naproxen (EC NAPROSYN) 375 MG TBEC Take 1 tablet (375 mg total) by mouth 2 (two) times a day with meals for 7 days 14 tablet 0     No current facility-administered medications on file prior to visit. Social History     Tobacco Use    Smoking status: Never    Smokeless tobacco: Never   Substance Use Topics    Alcohol use: Yes    Drug use: Never       History reviewed. No pertinent family history. Review of Systems     As stated in the HPI.  All other systems reviewed and are negative.

## 2025-07-16 ENCOUNTER — HOSPITAL ENCOUNTER (INPATIENT)
Facility: HOSPITAL | Age: 55
LOS: 4 days | Discharge: HOME | DRG: 383 | End: 2025-07-20
Attending: EMERGENCY MEDICINE | Admitting: INTERNAL MEDICINE
Payer: MEDICAID

## 2025-07-16 ENCOUNTER — APPOINTMENT (EMERGENCY)
Dept: CT IMAGING | Facility: HOSPITAL | Age: 55
DRG: 383 | End: 2025-07-16
Payer: MEDICAID

## 2025-07-16 ENCOUNTER — APPOINTMENT (EMERGENCY)
Dept: NON INVASIVE DIAGNOSTICS | Facility: HOSPITAL | Age: 55
DRG: 383 | End: 2025-07-16
Payer: MEDICAID

## 2025-07-16 DIAGNOSIS — R10.13 EPIGASTRIC PAIN: ICD-10-CM

## 2025-07-16 DIAGNOSIS — L03.115: Primary | ICD-10-CM

## 2025-07-16 DIAGNOSIS — B35.3 TINEA PEDIS: ICD-10-CM

## 2025-07-16 DIAGNOSIS — R59.9 REACTIVE LYMPHADENOPATHY: ICD-10-CM

## 2025-07-16 PROBLEM — E11.9 TYPE 2 DIABETES MELLITUS WITHOUT COMPLICATION, WITHOUT LONG-TERM CURRENT USE OF INSULIN (HCC): Status: ACTIVE | Noted: 2025-07-16

## 2025-07-16 PROBLEM — N18.31 STAGE 3A CHRONIC KIDNEY DISEASE (HCC): Status: ACTIVE | Noted: 2025-07-16

## 2025-07-16 PROBLEM — L03.116 CELLULITIS OF LEFT LOWER EXTREMITY: Status: ACTIVE | Noted: 2025-07-16

## 2025-07-16 PROBLEM — D69.6 THROMBOCYTOPENIA (HCC): Status: ACTIVE | Noted: 2025-07-16

## 2025-07-16 LAB
ALBUMIN SERPL BCG-MCNC: 4.1 G/DL (ref 3.5–5)
ALP SERPL-CCNC: 60 U/L (ref 34–104)
ALT SERPL W P-5'-P-CCNC: 19 U/L (ref 7–52)
ANION GAP SERPL CALCULATED.3IONS-SCNC: 9 MMOL/L (ref 4–13)
APTT PPP: 33 SECONDS (ref 23–34)
AST SERPL W P-5'-P-CCNC: 12 U/L (ref 13–39)
BASOPHILS # BLD AUTO: 0.03 THOUSANDS/ÂΜL (ref 0–0.1)
BASOPHILS NFR BLD AUTO: 0 % (ref 0–1)
BILIRUB SERPL-MCNC: 0.99 MG/DL (ref 0.2–1)
BUN SERPL-MCNC: 26 MG/DL (ref 5–25)
CALCIUM SERPL-MCNC: 9.6 MG/DL (ref 8.4–10.2)
CHLORIDE SERPL-SCNC: 100 MMOL/L (ref 96–108)
CO2 SERPL-SCNC: 26 MMOL/L (ref 21–32)
CREAT SERPL-MCNC: 1.42 MG/DL (ref 0.6–1.3)
EOSINOPHIL # BLD AUTO: 0.13 THOUSAND/ÂΜL (ref 0–0.61)
EOSINOPHIL NFR BLD AUTO: 1 % (ref 0–6)
ERYTHROCYTE [DISTWIDTH] IN BLOOD BY AUTOMATED COUNT: 12.6 % (ref 11.6–15.1)
GFR SERPL CREATININE-BSD FRML MDRD: 55 ML/MIN/1.73SQ M
GLUCOSE SERPL-MCNC: 123 MG/DL (ref 65–140)
GLUCOSE SERPL-MCNC: 172 MG/DL (ref 65–140)
HCT VFR BLD AUTO: 41.7 % (ref 36.5–49.3)
HGB BLD-MCNC: 14.1 G/DL (ref 12–17)
IMM GRANULOCYTES # BLD AUTO: 0.04 THOUSAND/UL (ref 0–0.2)
IMM GRANULOCYTES NFR BLD AUTO: 0 % (ref 0–2)
INR PPP: 1.19 (ref 0.85–1.19)
LACTATE SERPL-SCNC: 1.4 MMOL/L (ref 0.5–2)
LYMPHOCYTES # BLD AUTO: 1.25 THOUSANDS/ÂΜL (ref 0.6–4.47)
LYMPHOCYTES NFR BLD AUTO: 13 % (ref 14–44)
MCH RBC QN AUTO: 27.4 PG (ref 26.8–34.3)
MCHC RBC AUTO-ENTMCNC: 33.8 G/DL (ref 31.4–37.4)
MCV RBC AUTO: 81 FL (ref 82–98)
MONOCYTES # BLD AUTO: 0.49 THOUSAND/ÂΜL (ref 0.17–1.22)
MONOCYTES NFR BLD AUTO: 5 % (ref 4–12)
NEUTROPHILS # BLD AUTO: 7.43 THOUSANDS/ÂΜL (ref 1.85–7.62)
NEUTS SEG NFR BLD AUTO: 81 % (ref 43–75)
NRBC BLD AUTO-RTO: 0 /100 WBCS
PLATELET # BLD AUTO: 142 THOUSANDS/UL (ref 149–390)
PMV BLD AUTO: 9.5 FL (ref 8.9–12.7)
POTASSIUM SERPL-SCNC: 3.9 MMOL/L (ref 3.5–5.3)
PROCALCITONIN SERPL-MCNC: 0.4 NG/ML
PROT SERPL-MCNC: 7.7 G/DL (ref 6.4–8.4)
PROTHROMBIN TIME: 15.3 SECONDS (ref 12.3–15)
RBC # BLD AUTO: 5.15 MILLION/UL (ref 3.88–5.62)
SODIUM SERPL-SCNC: 135 MMOL/L (ref 135–147)
WBC # BLD AUTO: 9.37 THOUSAND/UL (ref 4.31–10.16)

## 2025-07-16 PROCEDURE — 87040 BLOOD CULTURE FOR BACTERIA: CPT | Performed by: EMERGENCY MEDICINE

## 2025-07-16 PROCEDURE — 85730 THROMBOPLASTIN TIME PARTIAL: CPT | Performed by: EMERGENCY MEDICINE

## 2025-07-16 PROCEDURE — 99223 1ST HOSP IP/OBS HIGH 75: CPT | Performed by: INTERNAL MEDICINE

## 2025-07-16 PROCEDURE — 85610 PROTHROMBIN TIME: CPT | Performed by: EMERGENCY MEDICINE

## 2025-07-16 PROCEDURE — 85025 COMPLETE CBC W/AUTO DIFF WBC: CPT | Performed by: EMERGENCY MEDICINE

## 2025-07-16 PROCEDURE — 96375 TX/PRO/DX INJ NEW DRUG ADDON: CPT

## 2025-07-16 PROCEDURE — 99284 EMERGENCY DEPT VISIT MOD MDM: CPT

## 2025-07-16 PROCEDURE — 87081 CULTURE SCREEN ONLY: CPT | Performed by: INTERNAL MEDICINE

## 2025-07-16 PROCEDURE — 93971 EXTREMITY STUDY: CPT | Performed by: SURGERY

## 2025-07-16 PROCEDURE — 82948 REAGENT STRIP/BLOOD GLUCOSE: CPT

## 2025-07-16 PROCEDURE — 96365 THER/PROPH/DIAG IV INF INIT: CPT

## 2025-07-16 PROCEDURE — 99285 EMERGENCY DEPT VISIT HI MDM: CPT | Performed by: EMERGENCY MEDICINE

## 2025-07-16 PROCEDURE — 36415 COLL VENOUS BLD VENIPUNCTURE: CPT | Performed by: EMERGENCY MEDICINE

## 2025-07-16 PROCEDURE — 73701 CT LOWER EXTREMITY W/DYE: CPT

## 2025-07-16 PROCEDURE — 84145 PROCALCITONIN (PCT): CPT | Performed by: EMERGENCY MEDICINE

## 2025-07-16 PROCEDURE — 80053 COMPREHEN METABOLIC PANEL: CPT | Performed by: EMERGENCY MEDICINE

## 2025-07-16 PROCEDURE — 83605 ASSAY OF LACTIC ACID: CPT | Performed by: EMERGENCY MEDICINE

## 2025-07-16 PROCEDURE — 93971 EXTREMITY STUDY: CPT

## 2025-07-16 RX ORDER — DOCUSATE SODIUM 100 MG/1
100 CAPSULE, LIQUID FILLED ORAL 2 TIMES DAILY
Status: DISCONTINUED | OUTPATIENT
Start: 2025-07-16 | End: 2025-07-20 | Stop reason: HOSPADM

## 2025-07-16 RX ORDER — ATORVASTATIN CALCIUM 80 MG/1
80 TABLET, FILM COATED ORAL DAILY
COMMUNITY

## 2025-07-16 RX ORDER — KETOROLAC TROMETHAMINE 30 MG/ML
15 INJECTION, SOLUTION INTRAMUSCULAR; INTRAVENOUS ONCE
Status: COMPLETED | OUTPATIENT
Start: 2025-07-16 | End: 2025-07-16

## 2025-07-16 RX ORDER — ONDANSETRON 2 MG/ML
4 INJECTION INTRAMUSCULAR; INTRAVENOUS EVERY 6 HOURS PRN
Status: DISCONTINUED | OUTPATIENT
Start: 2025-07-16 | End: 2025-07-20 | Stop reason: HOSPADM

## 2025-07-16 RX ORDER — ATORVASTATIN CALCIUM 10 MG/1
80 TABLET, FILM COATED ORAL DAILY
COMMUNITY
End: 2025-07-16 | Stop reason: CLARIF

## 2025-07-16 RX ORDER — SODIUM CHLORIDE 9 MG/ML
100 INJECTION, SOLUTION INTRAVENOUS CONTINUOUS
Status: DISCONTINUED | OUTPATIENT
Start: 2025-07-16 | End: 2025-07-17

## 2025-07-16 RX ORDER — ACETAMINOPHEN 325 MG/1
650 TABLET ORAL EVERY 4 HOURS PRN
Status: DISCONTINUED | OUTPATIENT
Start: 2025-07-16 | End: 2025-07-20 | Stop reason: HOSPADM

## 2025-07-16 RX ADMIN — IOHEXOL 100 ML: 350 INJECTION, SOLUTION INTRAVENOUS at 18:35

## 2025-07-16 RX ADMIN — DEXTROSE MONOHYDRATE 2000 MG: 50 INJECTION, SOLUTION INTRAVENOUS at 17:47

## 2025-07-16 RX ADMIN — SODIUM CHLORIDE 100 ML/HR: 0.9 INJECTION, SOLUTION INTRAVENOUS at 21:36

## 2025-07-16 RX ADMIN — DOCUSATE SODIUM 100 MG: 100 CAPSULE, LIQUID FILLED ORAL at 21:35

## 2025-07-16 RX ADMIN — KETOROLAC TROMETHAMINE 15 MG: 30 INJECTION, SOLUTION INTRAMUSCULAR; INTRAVENOUS at 17:45

## 2025-07-16 NOTE — ASSESSMENT & PLAN NOTE
Lab Results   Component Value Date    EGFR 55 07/16/2025    CREATININE 1.42 (H) 07/16/2025   Creatinine currently 1.42 in the setting of diabetes  Suspect secondary to chronic kidney disease secondary to diabetes  No previous lab work available  Patient notes he resides in [place] and had blood work with his family doctor there approximately 6 months ago  Reviewed and records in care everywhere, and queried [place] records with patient's permission: No previous lab work resulted  Monitor response to IV fluids

## 2025-07-16 NOTE — ASSESSMENT & PLAN NOTE
Patient is a 55-year-old male with past medical history significant for diabetes type 2 presents to the ER with right lower extremity infection    Afebrile, with normal white blood cell count  Procalcitonin elevated at 0.40  CT right lower extremity:Circumferential subcutaneous edema and skin thickening of the right lower extremity extending from the knee through the ankle, which can be seen with cellulitis. No fluid collection. No soft tissue gas. Right inguinal adenopathy, which may be reactive.  Patient was started on IV antibiotics with ceftriaxone  No concurrent sepsis  Blood culture: Pending x 2  MRSA nares swab: Pending: No previous history of MRSA  Continue IV antibiotics, IV fluids and monitor response

## 2025-07-16 NOTE — ED PROVIDER NOTES
Time reflects when diagnosis was documented in both MDM as applicable and the Disposition within this note       Time User Action Codes Description Comment    7/16/2025  7:42 PM Geovanni Goldberg Add [L03.115] Cellulitis of right lower extremity from knee to ankle     7/16/2025  7:42 PM Geovanni Goldberg Add [R59.9] Reactive lymphadenopathy           ED Disposition       ED Disposition   Admit    Condition   Stable    Date/Time   Wed Jul 16, 2025  7:42 PM    Comment   Case was discussed with Dr. Reich and the patient's admission status was agreed to be Admission Status: inpatient status to the service of Dr. Reich .               Assessment & Plan       Medical Decision Making  Based on my history and physical examination, I considered the following life or limb threatening conditions in my differential diagnosis:cellulitis, abscess, jlymphangitis, DVT, vasculitis.    Based on my clinical acumen, I will order the appropriate diagnostic testing to narrow my differential diagnosis and arrive at a final diagnosis.      Amount and/or Complexity of Data Reviewed  Labs: ordered. Decision-making details documented in ED Course.  Radiology: ordered. Decision-making details documented in ED Course.    Risk  Prescription drug management.  Decision regarding hospitalization.      ED Course as of 07/16/25 2015 Wed Jul 16, 2025   1800 WBC: 9.37  normal   1801 VAS lower limb venous duplex study, unilateral/limited  Preliminary report, no DVT, enlarged lymph nodes noted incidentally   1820 LACTIC ACID: 1.4  normal   1959 Reviewed results with patient at bedside and updated on the plan to admit to the hospital.  D/W Dr. Reich       Medications   ceftriaxone (ROCEPHIN) 2 g/50 mL in dextrose IVPB (0 mg Intravenous Stopped 7/16/25 1910)   ketorolac (TORADOL) injection 15 mg (15 mg Intravenous Given 7/16/25 1745)   iohexol (OMNIPAQUE) 350 MG/ML injection (MULTI-DOSE) 100 mL (100 mL Intravenous Given 7/16/25 1835)       ED Risk  "Strat Scores                    No data recorded              Wells' Criteria for DVT      Flowsheet Row Most Recent Value   Wells' Criteria for DVT    Active cancer Treatment or palliation within 6 months 0 Filed at: 07/16/2025 1712   Bedridden recently >3 days or major surgery within 12 weeks 0 Filed at: 07/16/2025 1712   Calf swelling >3 cm compared to the other leg 1 Filed at: 07/16/2025 1712   Entire leg swollen 1 Filed at: 07/16/2025 1712   Collateral (nonvaricose) superficial veins present 0 Filed at: 07/16/2025 1712   Localized tenderness along the deep venous system 1 Filed at: 07/16/2025 1712   Pitting edema, confined to symptomatic leg 1 Filed at: 07/16/2025 1712   Paralysis, paresis, or recent plaster immobilization of the lower extremity 0 Filed at: 07/16/2025 1712   Previously documented DVT 0 Filed at: 07/16/2025 1712   Alternative diagnosis to DVT as likely or more likely 0 Filed at: 07/16/2025 1712   Wells DVT Critera Score 4 Filed at: 07/16/2025 1712                      History of Present Illness       Chief Complaint   Patient presents with   • Leg Swelling     C/o RLE swelling and pain since Monday, also reports fevers.         Past Medical History[1]   Past Surgical History[2]   Family History[3]   Social History[4]   E-Cigarette/Vaping      E-Cigarette/Vaping Substances      I have reviewed and agree with the history as documented.     56 yo M with NIDDM2, hyperlipidemia, c/o right leg pain, redness and swelling, onset since 7/14.  He drove his Mother to the airport then drove back.  He first experienced pain in his right groin with some focal red swelling, and by the evening he had a few red areas spreading down the right inner thigh, and then his right lower leg became red, swollen, and very tender.  He has been having subjective fever overnight, \"soaking the bed with sweat\".  He denies any injury, this has never happened before.          Review of Systems        Objective       ED Triage " Vitals   Temperature Pulse Blood Pressure Respirations SpO2 Patient Position - Orthostatic VS   07/16/25 1610 07/16/25 1610 07/16/25 1610 07/16/25 1610 07/16/25 1610 07/16/25 1610   97.9 °F (36.6 °C) 101 134/76 18 99 % Sitting      Temp Source Heart Rate Source BP Location FiO2 (%) Pain Score    07/16/25 1610 07/16/25 1610 07/16/25 1610 -- 07/16/25 1745    Oral Monitor Right arm  7      Vitals      Date and Time Temp Pulse SpO2 Resp BP Pain Score FACES Pain Rating User   07/16/25 1908 -- 96 100 % 16 139/83 -- -- EG   07/16/25 1906 -- -- -- -- -- 5 -- EG   07/16/25 1745 -- -- -- -- -- 7 -- AO   07/16/25 1610 97.9 °F (36.6 °C) 101 99 % 18 134/76 -- -- CO            Physical Exam  Vitals and nursing note reviewed.   Constitutional:       Appearance: He is well-developed.   HENT:      Head: Normocephalic and atraumatic.      Right Ear: External ear normal.      Left Ear: External ear normal.      Nose: Nose normal.      Mouth/Throat:      Mouth: Mucous membranes are moist.     Eyes:      Conjunctiva/sclera: Conjunctivae normal.      Pupils: Pupils are equal, round, and reactive to light.       Cardiovascular:      Rate and Rhythm: Normal rate.      Pulses:           Dorsalis pedis pulses are 2+ on the right side and 2+ on the left side.        Posterior tibial pulses are 2+ on the right side and 2+ on the left side.   Pulmonary:      Effort: Pulmonary effort is normal.   Abdominal:      Tenderness: There is no abdominal tenderness.     Musculoskeletal:         General: Normal range of motion.      Cervical back: Normal range of motion and neck supple.      Right lower leg: Swelling and tenderness (right lower lower leg) present. Edema present.        Legs:      Skin:     General: Skin is warm and dry.      Capillary Refill: Capillary refill takes less than 2 seconds.     Neurological:      Mental Status: He is alert and oriented to person, place, and time.      Cranial Nerves: No cranial nerve deficit.       Coordination: Coordination normal.     Psychiatric:         Behavior: Behavior normal.         Thought Content: Thought content normal.         Judgment: Judgment normal.             Results Reviewed       Procedure Component Value Units Date/Time    Procalcitonin [[id number]]  (Abnormal) Collected: 07/16/25 1733    Lab Status: Final result Specimen: Blood from Arm, Left Updated: 07/16/25 1847     Procalcitonin 0.40 ng/ml     Lactic acid [[id number]]  (Normal) Collected: 07/16/25 1733    Lab Status: Final result Specimen: Blood from Arm, Left Updated: 07/16/25 1810     LACTIC ACID 1.4 mmol/L     Narrative:      Result may be elevated if tourniquet was used during collection.    Comprehensive metabolic panel [[id number]]  (Abnormal) Collected: 07/16/25 1733    Lab Status: Final result Specimen: Blood from Arm, Left Updated: 07/16/25 1809     Sodium 135 mmol/L      Potassium 3.9 mmol/L      Chloride 100 mmol/L      CO2 26 mmol/L      ANION GAP 9 mmol/L      BUN 26 mg/dL      Creatinine 1.42 mg/dL      Glucose 172 mg/dL      Calcium 9.6 mg/dL      AST 12 U/L      ALT 19 U/L      Alkaline Phosphatase 60 U/L      Total Protein 7.7 g/dL      Albumin 4.1 g/dL      Total Bilirubin 0.99 mg/dL      eGFR 55 ml/min/1.73sq m     Narrative:      National Kidney Disease Foundation guidelines for Chronic Kidney Disease (CKD):   •  Stage 1 with normal or high GFR (GFR > 90 mL/min/1.73 square meters)  •  Stage 2 Mild CKD (GFR = 60-89 mL/min/1.73 square meters)  •  Stage 3A Moderate CKD (GFR = 45-59 mL/min/1.73 square meters)  •  Stage 3B Moderate CKD (GFR = 30-44 mL/min/1.73 square meters)  •  Stage 4 Severe CKD (GFR = 15-29 mL/min/1.73 square meters)  •  Stage 5 End Stage CKD (GFR <15 mL/min/1.73 square meters)  Note: GFR calculation is accurate only with a steady state creatinine    Protime-INR [[id number]]  (Abnormal) Collected: 07/16/25 1733    Lab Status: Final result Specimen: Blood from Arm, Left Updated: 07/16/25 1807     Protime  15.3 seconds      INR 1.19    Narrative:      INR Therapeutic Range    Indication                                             INR Range      Atrial Fibrillation                                               2.0-3.0  Hypercoagulable State                                    2.0.2.3  Left Ventricular Asist Device                            2.0-3.0  Mechanical Heart Valve                                  -    Aortic(with afib, MI, embolism, HF, LA enlargement,    and/or coagulopathy)                                     2.0-3.0 (2.5-3.5)     Mitral                                                             2.5-3.5  Prosthetic/Bioprosthetic Heart Valve               2.0-3.0  Venous thromboembolism (VTE: VT, PE        2.0-3.0    APTT [[id number]]  (Normal) Collected: 07/16/25 1733    Lab Status: Final result Specimen: Blood from Arm, Left Updated: 07/16/25 1807     PTT 33 seconds     CBC and differential [[id number]]  (Abnormal) Collected: 07/16/25 1733    Lab Status: Final result Specimen: Blood from Arm, Left Updated: 07/16/25 1750     WBC 9.37 Thousand/uL      RBC 5.15 Million/uL      Hemoglobin 14.1 g/dL      Hematocrit 41.7 %      MCV 81 fL      MCH 27.4 pg      MCHC 33.8 g/dL      RDW 12.6 %      MPV 9.5 fL      Platelets 142 Thousands/uL      nRBC 0 /100 WBCs      Segmented % 81 %      Immature Grans % 0 %      Lymphocytes % 13 %      Monocytes % 5 %      Eosinophils Relative 1 %      Basophils Relative 0 %      Absolute Neutrophils 7.43 Thousands/µL      Absolute Immature Grans 0.04 Thousand/uL      Absolute Lymphocytes 1.25 Thousands/µL      Absolute Monocytes 0.49 Thousand/µL      Eosinophils Absolute 0.13 Thousand/µL      Basophils Absolute 0.03 Thousands/µL     Blood culture #2 [[id number]] Collected: 07/16/25 1742    Lab Status: In process Specimen: Blood from Hand, Left Updated: 07/16/25 1747    Blood culture #1 [[id number]] Collected: 07/16/25 1733    Lab Status: In process Specimen: Blood from Arm, Left  Updated: 07/16/25 0719            CT lower extremity w contrast right   Final Interpretation by Erick Henderson MD (07/16 1914)      Circumferential subcutaneous edema and skin thickening of the right lower extremity extending from the knee through the ankle, which can be seen with cellulitis. No fluid collection. No soft tissue gas.      Right inguinal adenopathy, which may be reactive.         Workstation performed: [id number]         VAS lower limb venous duplex study, unilateral/limited    (Results Pending)       Procedures    ED Medication and Procedure Management   Prior to Admission Medications   Prescriptions Last Dose Informant Patient Reported? Taking?   meloxicam (Mobic) 15 mg tablet   No No   Sig: Take 1 tablet (15 mg total) by mouth daily   meloxicam (Mobic) 15 mg tablet   No No   Sig: Take 1 tablet (15 mg total) by mouth daily   metFORMIN (GLUCOPHAGE-XR) 750 mg 24 hr tablet   Yes No   Sig: Take 750 mg by mouth daily with breakfast   naproxen (EC NAPROSYN) 375 MG TBEC   No No   Sig: Take 1 tablet (375 mg total) by mouth 2 (two) times a day with meals for 7 days      Facility-Administered Medications: None     Patient's Medications   Discharge Prescriptions    No medications on file     No discharge procedures on file.  ED SEPSIS DOCUMENTATION   Time reflects when diagnosis was documented in both MDM as applicable and the Disposition within this note       Time User Action Codes Description Comment    7/16/2025  7:42 PM Geovanni Goldberg Add [L03.115] Cellulitis of right lower extremity from knee to ankle     7/16/2025  7:42 PM Geovanni Goldberg Add [R59.9] Reactive lymphadenopathy            Initial Sepsis Screening       Row Name 07/16/25 2770                Is the patient's history suggestive of a new or worsening infection? Yes (Proceed)  -RM        Suspected source of infection soft tissue  -RM        Indicate SIRS criteria Tachycardia > 90 bpm  -RM        Are two or more of the above  signs & symptoms of infection both present and new to the patient? No  -RM                  User Key  (r) = Recorded By, (t) = Taken By, (c) = Cosigned By      Initials Name Provider Type    RM Geovanni Goldberg MD Physician                             [1]  Past Medical History:  Diagnosis Date   • Diabetes mellitus (HCC)    • Hyperlipidemia    [2]  No past surgical history on file.  [3]  No family history on file.  [4]  Social History  Tobacco Use   • Smoking status: Never   • Smokeless tobacco: Never   Substance Use Topics   • Alcohol use: Yes   • Drug use: Never      Geovanni Goldberg MD  07/16/25 7769

## 2025-07-16 NOTE — ASSESSMENT & PLAN NOTE
"No results found for: \"HGBA1C\"  Home medications include metformin  mg daily  Hold oral agents while admission  Accu-Cheks, sliding scale insulin  "

## 2025-07-16 NOTE — H&P
"H&P - Hospitalist   Name: Gerdys Antonio ReyesCuevas 55 y.o. male I MRN: [id number]  Unit/Bed#: ED-05 I Date of Admission: 7/16/2025   Date of Service: 7/16/2025 I Hospital Day: 0     Assessment & Plan  Cellulitis of right lower extremity  Patient is a 55-year-old male with past medical history significant for diabetes type 2 presents to the ER with right lower extremity infection    Afebrile, with normal white blood cell count  Procalcitonin elevated at 0.40  CT right lower extremity:Circumferential subcutaneous edema and skin thickening of the right lower extremity extending from the knee through the ankle, which can be seen with cellulitis. No fluid collection. No soft tissue gas. Right inguinal adenopathy, which may be reactive.  Patient was started on IV antibiotics with ceftriaxone  No concurrent sepsis  Blood culture: Pending x 2  MRSA nares swab: Pending: No previous history of MRSA  Continue IV antibiotics, IV fluids and monitor response  Type 2 diabetes mellitus without complication, without long-term current use of insulin ([facility])  No results found for: \"HGBA1C\"  Home medications include metformin  mg daily  Hold oral agents while admission  Accu-Cheks, sliding scale insulin  Stage 3a chronic kidney disease ([facility])  Lab Results   Component Value Date    EGFR 55 07/16/2025    CREATININE 1.42 (H) 07/16/2025   Creatinine currently 1.42 in the setting of diabetes  Suspect secondary to chronic kidney disease secondary to diabetes  No previous lab work available  Patient notes he resides in [place] and had blood work with his family doctor there approximately 6 months ago  Reviewed and records in care everywhere, and queried [place] records with patient's permission: No previous lab work resulted  Monitor response to IV fluids  Thrombocytopenia (HCC)  Patient with mild thrombocytopenia at 142  No significant coagulopathy  Possibly secondary to acute infection  Continue to monitor      VTE Pharmacologic " Prophylaxis: VTE Score: 2 Low Risk (Score 0-2) - Encourage Ambulation.  Code Status: Full code  Discussion: Updated patient in [place] at bedside.  Reviewed test results, treatment plan.  Answered all questions.    Anticipated Length of Stay: Patient will be admitted on an inpatient basis with an anticipated length of stay of greater than 2 midnights secondary to cellulitis, requiring IV antibiotics.    History of Present Illness   Chief Complaint: Right lower extremity redness, pain, swelling x 3 days    Gerdys Antonio ReyesCuevas is a 55 y.o. male with a PMH of diabetes type 2 who presents for evaluation of right lower extremity redness, pain, swelling.    Patient relates that he lives in [place] and gets all of his care in [place] however he works locally in [place].  His daughter lives here so he travels back and forth.    Patient notes that for the past 3 days he has noticed increasing redness, pain, swelling in his right leg extending from his knee to his toes.  He denies any trauma.    Patient notes at home he had fevers where he would soak through his close at night.  Denied any other rash.  Denied any previous skin infections.    Patient notes he has a history of diabetes.  He takes metformin once a day.  He follows with his family doctor in [place].      Patient denies any other past medical history apart from hyperlipidemia and mildly elevated blood pressures that does not yet require antihypertensive therapy.    He states he was never told he had any kidney disorder.  He states he does not routinely get blood work but believes he had blood work done approximately 6-7 months ago in [place].    Patient denies any chest pain.  Denies any shortness of breath or cough.  Denies any abdominal pain.  Denies any nausea, vomiting, diarrhea or constipation.  Is tolerating p.o.  Denies any dizziness or lightheadedness.  Notes pain in his leg with ambulating.    Denies any allergy to antibiotics or  medications.        Review of Systems    Historical Information   Past Medical History[1]  Past Surgical History[2]  Social History[3]  E-Cigarette/Vaping     E-Cigarette/Vaping Substances     Family history non-contributory  Social History:  Marital Status: Single     Meds/Allergies   I have reviewed home medications with patient personally.  Prior to Admission medications    Medication Sig Start Date End Date Taking? Authorizing Provider   meloxicam (Mobic) 15 mg tablet Take 1 tablet (15 mg total) by mouth daily 11/1/23   Robbi Javed PA-C   meloxicam (Mobic) 15 mg tablet Take 1 tablet (15 mg total) by mouth daily 12/13/23   Robbi Javed PA-C   metFORMIN (GLUCOPHAGE-XR) 750 mg 24 hr tablet Take 750 mg by mouth daily with breakfast    Historical Provider, MD   naproxen (EC NAPROSYN) 375 MG TBEC Take 1 tablet (375 mg total) by mouth 2 (two) times a day with meals for 7 days 9/21/23 9/28/23  Aylin Almazan, DO     No Known Allergies    Objective :  Temp:  [97.9 °F (36.6 °C)] 97.9 °F (36.6 °C)  HR:  [] 96  BP: (134-139)/(76-83) 139/83  Resp:  [16-18] 16  SpO2:  [99 %-100 %] 100 %  O2 Device: None (Room air)    Physical Exam   General: Very pleasant male.  No acute distress.  Nontachypneic nondyspneic  Heart: Regular rate and rhythm.  S1-S2 present.  No murmur, rub, gallop  Lungs: Clear to auscultation bilaterally, good air movement.  No wheezes, crackles, rhonchi.  No accessory muscle use or respiratory distress  Abdomen: Soft, nontender with palpation, nondistended, normal active bowel sounds present.  No guarding or rebound.  No peritoneal signs or mass  Extremities: 1+ right lower extremity edema distal from the knee extending to the toes.  2+ pedal pulses.  No clubbing or cyanosis  Skin:+ Confluent erythema extending distal from the knee to the dorsum of the foot.  Few shallow skin tears noted.  Few, faint scattered nonblanching petechiae.  No bullae.  No  "fluctuance.  Neurologic: Awake and alert.  Oriented.  Interactive.  Moving all 4 extremities.    Lines/Drains:      Lab Results: I have reviewed the following results:  Results from last 7 days   Lab Units 07/16/25  1733   WBC Thousand/uL 9.37   HEMOGLOBIN g/dL 14.1   HEMATOCRIT % 41.7   PLATELETS Thousands/uL 142*   SEGS PCT % 81*   LYMPHO PCT % 13*   MONO PCT % 5   EOS PCT % 1     Results from last 7 days   Lab Units 07/16/25  1733   SODIUM mmol/L 135   POTASSIUM mmol/L 3.9   CHLORIDE mmol/L 100   CO2 mmol/L 26   BUN mg/dL 26*   CREATININE mg/dL 1.42*   ANION GAP mmol/L 9   CALCIUM mg/dL 9.6   ALBUMIN g/dL 4.1   TOTAL BILIRUBIN mg/dL 0.99   ALK PHOS U/L 60   ALT U/L 19   AST U/L 12*   GLUCOSE RANDOM mg/dL 172*     Results from last 7 days   Lab Units 07/16/25  1733   INR  1.19         No results found for: \"HGBA1C\"  Results from last 7 days   Lab Units 07/16/25  1733   LACTIC ACID mmol/L 1.4   PROCALCITONIN ng/ml 0.40*       Imaging reports  CT right lower extremity:  Circumferential subcutaneous edema and skin thickening of the right lower extremity extending from the knee through the ankle, which can be seen with cellulitis. No fluid collection. No soft tissue gas.   Right inguinal adenopathy, which may be reactive  .  Administrative Statements       ** Please Note: This note has been constructed using a voice recognition system. **         [1]   Past Medical History:  Diagnosis Date    Diabetes mellitus (HCC)     Hyperlipidemia    [2] No past surgical history on file.  [3]   Social History  Tobacco Use    Smoking status: Never    Smokeless tobacco: Never   Substance and Sexual Activity    Alcohol use: Yes    Drug use: Never     "

## 2025-07-16 NOTE — ASSESSMENT & PLAN NOTE
Patient with mild thrombocytopenia at 142  No significant coagulopathy  Possibly secondary to acute infection  Continue to monitor

## 2025-07-16 NOTE — LETTER
[place] [id number]  [address] [facility] [place] [address]  Dept: [phone]    July 20, 2025     Patient: Gerdys Antonio ReyesCuevas   YOB: 1970   Date of Visit: 7/16/2025       To Whom it May Concern:    Gerdys ReyesCuevas is under my professional care. He was seen in the hospital from 7/16/2025 to 07/20/25. He may return to work on 7/21/2025 with the following limitations light duty for 1 week.    If you have any questions or concerns, please don't hesitate to call.         Sincerely,          Turner Clark PA-C

## 2025-07-17 PROBLEM — E66.811 CLASS 1 OBESITY WITH SERIOUS COMORBIDITY AND BODY MASS INDEX (BMI) OF 31.0 TO 31.9 IN ADULT: Status: ACTIVE | Noted: 2025-07-17

## 2025-07-17 LAB
ANION GAP SERPL CALCULATED.3IONS-SCNC: 8 MMOL/L (ref 4–13)
BACTERIA UR QL AUTO: ABNORMAL /HPF
BILIRUB UR QL STRIP: NEGATIVE
BUN SERPL-MCNC: 16 MG/DL (ref 5–25)
CALCIUM SERPL-MCNC: 8.9 MG/DL (ref 8.4–10.2)
CHLORIDE SERPL-SCNC: 101 MMOL/L (ref 96–108)
CLARITY UR: CLEAR
CO2 SERPL-SCNC: 25 MMOL/L (ref 21–32)
COLOR UR: ABNORMAL
CREAT SERPL-MCNC: 0.69 MG/DL (ref 0.6–1.3)
ERYTHROCYTE [DISTWIDTH] IN BLOOD BY AUTOMATED COUNT: 12.6 % (ref 11.6–15.1)
EST. AVERAGE GLUCOSE BLD GHB EST-MCNC: 183 MG/DL
GFR SERPL CREATININE-BSD FRML MDRD: 107 ML/MIN/1.73SQ M
GLUCOSE SERPL-MCNC: 154 MG/DL (ref 65–140)
GLUCOSE SERPL-MCNC: 164 MG/DL (ref 65–140)
GLUCOSE SERPL-MCNC: 208 MG/DL (ref 65–140)
GLUCOSE SERPL-MCNC: 215 MG/DL (ref 65–140)
GLUCOSE UR STRIP-MCNC: ABNORMAL MG/DL
HBA1C MFR BLD: 8 %
HCT VFR BLD AUTO: 39.7 % (ref 36.5–49.3)
HGB BLD-MCNC: 13.5 G/DL (ref 12–17)
HGB UR QL STRIP.AUTO: NEGATIVE
KETONES UR STRIP-MCNC: NEGATIVE MG/DL
LEUKOCYTE ESTERASE UR QL STRIP: NEGATIVE
MCH RBC QN AUTO: 27.7 PG (ref 26.8–34.3)
MCHC RBC AUTO-ENTMCNC: 34 G/DL (ref 31.4–37.4)
MCV RBC AUTO: 82 FL (ref 82–98)
NITRITE UR QL STRIP: NEGATIVE
NON-SQ EPI CELLS URNS QL MICRO: ABNORMAL /HPF
PH UR STRIP.AUTO: 5.5 [PH]
PLATELET # BLD AUTO: 137 THOUSANDS/UL (ref 149–390)
PMV BLD AUTO: 9.9 FL (ref 8.9–12.7)
POTASSIUM SERPL-SCNC: 3.8 MMOL/L (ref 3.5–5.3)
PROT UR STRIP-MCNC: NEGATIVE MG/DL
RBC # BLD AUTO: 4.87 MILLION/UL (ref 3.88–5.62)
RBC #/AREA URNS AUTO: ABNORMAL /HPF
SODIUM SERPL-SCNC: 134 MMOL/L (ref 135–147)
SP GR UR STRIP.AUTO: 1.01 (ref 1–1.03)
UROBILINOGEN UR STRIP-ACNC: <2 MG/DL
WBC # BLD AUTO: 9.5 THOUSAND/UL (ref 4.31–10.16)
WBC #/AREA URNS AUTO: ABNORMAL /HPF

## 2025-07-17 PROCEDURE — 83036 HEMOGLOBIN GLYCOSYLATED A1C: CPT

## 2025-07-17 PROCEDURE — 81001 URINALYSIS AUTO W/SCOPE: CPT

## 2025-07-17 PROCEDURE — 82948 REAGENT STRIP/BLOOD GLUCOSE: CPT

## 2025-07-17 PROCEDURE — NC001 PR NO CHARGE: Performed by: INTERNAL MEDICINE

## 2025-07-17 PROCEDURE — 85027 COMPLETE CBC AUTOMATED: CPT | Performed by: INTERNAL MEDICINE

## 2025-07-17 PROCEDURE — 99254 IP/OBS CNSLTJ NEW/EST MOD 60: CPT | Performed by: INTERNAL MEDICINE

## 2025-07-17 PROCEDURE — 99232 SBSQ HOSP IP/OBS MODERATE 35: CPT

## 2025-07-17 PROCEDURE — 80048 BASIC METABOLIC PNL TOTAL CA: CPT | Performed by: INTERNAL MEDICINE

## 2025-07-17 PROCEDURE — G0545 PR INHERENT VISIT TO INPT: HCPCS | Performed by: INTERNAL MEDICINE

## 2025-07-17 RX ORDER — ACETAMINOPHEN 10 MG/ML
1000 INJECTION, SOLUTION INTRAVENOUS ONCE
Status: COMPLETED | OUTPATIENT
Start: 2025-07-17 | End: 2025-07-18

## 2025-07-17 RX ORDER — INSULIN LISPRO 100 [IU]/ML
1-5 INJECTION, SOLUTION INTRAVENOUS; SUBCUTANEOUS
Status: DISCONTINUED | OUTPATIENT
Start: 2025-07-17 | End: 2025-07-20 | Stop reason: HOSPADM

## 2025-07-17 RX ORDER — INSULIN LISPRO 100 [IU]/ML
1-5 INJECTION, SOLUTION INTRAVENOUS; SUBCUTANEOUS
Status: DISCONTINUED | OUTPATIENT
Start: 2025-07-17 | End: 2025-07-17

## 2025-07-17 RX ORDER — CLOTRIMAZOLE 1 %
CREAM (GRAM) TOPICAL 2 TIMES DAILY
Status: DISCONTINUED | OUTPATIENT
Start: 2025-07-17 | End: 2025-07-20 | Stop reason: HOSPADM

## 2025-07-17 RX ORDER — CEFAZOLIN SODIUM 2 G/50ML
2000 SOLUTION INTRAVENOUS EVERY 8 HOURS
Status: DISCONTINUED | OUTPATIENT
Start: 2025-07-17 | End: 2025-07-20 | Stop reason: HOSPADM

## 2025-07-17 RX ORDER — ATORVASTATIN CALCIUM 80 MG/1
80 TABLET, FILM COATED ORAL
Status: DISCONTINUED | OUTPATIENT
Start: 2025-07-18 | End: 2025-07-20 | Stop reason: HOSPADM

## 2025-07-17 RX ADMIN — CEFAZOLIN SODIUM 2000 MG: 2 SOLUTION INTRAVENOUS at 20:15

## 2025-07-17 RX ADMIN — ONDANSETRON 4 MG: 2 INJECTION INTRAMUSCULAR; INTRAVENOUS at 19:37

## 2025-07-17 RX ADMIN — SODIUM CHLORIDE 100 ML/HR: 0.9 INJECTION, SOLUTION INTRAVENOUS at 08:18

## 2025-07-17 RX ADMIN — INSULIN LISPRO 1 UNITS: 100 INJECTION, SOLUTION INTRAVENOUS; SUBCUTANEOUS at 16:52

## 2025-07-17 RX ADMIN — CEFAZOLIN SODIUM 2000 MG: 2 SOLUTION INTRAVENOUS at 12:21

## 2025-07-17 RX ADMIN — INSULIN LISPRO 1 UNITS: 100 INJECTION, SOLUTION INTRAVENOUS; SUBCUTANEOUS at 23:28

## 2025-07-17 RX ADMIN — ACETAMINOPHEN 650 MG: 325 TABLET ORAL at 09:42

## 2025-07-17 RX ADMIN — ACETAMINOPHEN 1000 MG: 10 INJECTION INTRAVENOUS at 19:37

## 2025-07-17 RX ADMIN — ONDANSETRON 4 MG: 2 INJECTION INTRAMUSCULAR; INTRAVENOUS at 08:18

## 2025-07-17 RX ADMIN — CLOTRIMAZOLE: 1 CREAM TOPICAL at 16:56

## 2025-07-17 RX ADMIN — INSULIN LISPRO 1 UNITS: 100 INJECTION, SOLUTION INTRAVENOUS; SUBCUTANEOUS at 12:21

## 2025-07-17 NOTE — ASSESSMENT & PLAN NOTE
Lab Results   Component Value Date    EGFR 107 07/17/2025    EGFR 55 07/16/2025    CREATININE 0.69 07/17/2025    CREATININE 1.42 (H) 07/16/2025     Estimated Creatinine Clearance: 145.1 mL/min (by C-G formula based on SCr of 0.69 mg/dL).   - Dose adjust antimicrobials as needed

## 2025-07-17 NOTE — ASSESSMENT & PLAN NOTE
Presenting with worsening RLE pain, fevers and chills..  Started with right inguinal pain, then progressive erythema.  No trauma, history of skin infection or diabetic wounds.  CT of the lower leg with circumferential subcutaneous edema and skin thickening, no soft tissue gas.  Right inguinal adenopathy which is reactive  Venous duplex negative  Appreciate infectious disease evaluation  Suspected with streptococcal infection due to streaking erythema  IV Rocephin transitioned over to IV cefazolin  Remains hemodynamically stable without SIRS or septic criteria  Follow-up blood cultures from 7/16/2025  Recommending topical antifungal and emollients to dry cracked skin on lower extremities which could be risk factor to cellulitis

## 2025-07-17 NOTE — ASSESSMENT & PLAN NOTE
Mild which may be in setting of infection, possible hemodilution.  No DVT in lower extremities  Not on AC/AP

## 2025-07-17 NOTE — PROGRESS NOTES
"Progress Note - Hospitalist   Name: Gerdys Antonio ReyesCuevas 55 y.o. male I MRN: [id number]  Unit/Bed#: [facility] [address] -01 I Date of Admission: 7/16/2025   Date of Service: 7/17/2025 I Hospital Day: 1    Assessment & Plan  Cellulitis of right lower extremity  Presenting with worsening RLE pain, fevers and chills..  Started with right inguinal pain, then progressive erythema.  No trauma, history of skin infection or diabetic wounds.  CT of the lower leg with circumferential subcutaneous edema and skin thickening, no soft tissue gas.  Right inguinal adenopathy which is reactive  Venous duplex negative  Appreciate infectious disease evaluation  Suspected with streptococcal infection due to streaking erythema  IV Rocephin transitioned over to IV cefazolin  Remains hemodynamically stable without SIRS or septic criteria  Follow-up blood cultures from 7/16/2025  Recommending topical antifungal and emollients to dry cracked skin on lower extremities which could be risk factor to cellulitis  Type 2 diabetes mellitus without complication, without long-term current use of insulin (HCC)  No results found for: \"HGBA1C\"  Home medications include metformin  mg daily  Holding home medication, obtaining A1c  Place on sliding scale insulin with Accu-Cheks  Stage 3a chronic kidney disease (HCC)  Lab Results   Component Value Date    EGFR 107 07/17/2025    EGFR 55 07/16/2025    CREATININE 0.69 07/17/2025    CREATININE 1.42 (H) 07/16/2025   Suspect patient had LISA on possible CKD on admission  Was having vomiting which has now resolved  Responded and Cr normalized after fluids  Tolerating diet.  Denies any difficulty voiding.  Check a UA, monitor off fluids  Thrombocytopenia (HCC)  Mild which may be in setting of infection, possible hemodilution.  No DVT in lower extremities  Not on AC/AP  Class 1 obesity with serious comorbidity and body mass index (BMI) of 31.0 to 31.9 in adult  Noted contributes all aspects of care    VTE " Pharmacologic Prophylaxis: VTE Score: 2 Low Risk (Score 0-2) - Encourage Ambulation.    Mobility:   Basic Mobility Inpatient Raw Score: 24  JH-HLM Goal: 8: Walk 250 feet or more  JH-HLM Achieved: 7: Walk 25 feet or more  JH-HLM Goal achieved. Continue to encourage appropriate mobility.    Patient Centered Rounds: I performed bedside rounds with nursing staff today.   Discussions with Specialists or Other Care Team Provider: Infectious disease, CM    Education and Discussions with Family / Patient: Updated  (significant other) at bedside.    Current Length of Stay: 1 day(s)  Current Patient Status: Inpatient   Certification Statement: The patient will continue to require additional inpatient hospital stay due to IV antibiotics, monitoring blood cultures  Discharge Plan: Anticipate discharge in 24-48 hrs to home.    Code Status: Level 1 - Full Code    Subjective   Patient seen and examined.  [place] translation used. Family bedside. States he is feeling better since coming into the hospital.  He is not having fevers and chills like he was at home.  Reports the swelling in his right inguinal lymph node has gone down, he still has some tenderness.  Does have some pain of his lower extremity and describes it as a burning pain, also hurts when he stands on it.  We reviewed his history, no trauma to his back or lower extremity.  No rash or open cut noted anywhere else.  Denies any difficulties urinating.  Notes he did have some nausea and vomiting but after he ate he feels better.  He denies any abdominal pain.  Denies any acid reflux or regurgitation.    Objective :  Temp:  [97.9 °F (36.6 °C)-99.8 °F (37.7 °C)] 99.8 °F (37.7 °C)  HR:  [] 105  BP: (134-173)/() 141/86  Resp:  [16-18] 18  SpO2:  [96 %-100 %] 98 %  O2 Device: None (Room air)    Body mass index is 31.63 kg/m².     Input and Output Summary (last 24 hours):     Intake/Output Summary (Last 24 hours) at 7/17/2025 8691  Last data filed at  7/17/2025 1100  Gross per 24 hour   Intake 50 ml   Output 300 ml   Net -250 ml       Physical Exam  Vitals and nursing note reviewed.   Constitutional:       Appearance: Normal appearance. He is obese. He is not ill-appearing or diaphoretic.     Cardiovascular:      Rate and Rhythm: Normal rate and regular rhythm.   Pulmonary:      Effort: Pulmonary effort is normal. No respiratory distress.      Breath sounds: Normal breath sounds.   Abdominal:      General: Bowel sounds are normal.      Palpations: Abdomen is soft.      Tenderness: There is no abdominal tenderness. There is no guarding.     Musculoskeletal:         General: Tenderness present.      Right lower leg: Edema present.      Left lower leg: No edema.     Skin:     General: Skin is warm and dry.      Findings: Erythema present.      Comments: RLE erythematous skin changes with some streaking/raised skin. No vesicular lesions.  No rash or wound above right knee, back.  Normal range of motion of RLE.  Calf compartment soft to palpation, tender and warm to touch. Dry cracked skin on feet without open wound. Old scars to anterior RLE noted. See media     Neurological:      Mental Status: He is alert and oriented to person, place, and time. Mental status is at baseline.         Lab Results: I have reviewed the following results:   Results from last 7 days   Lab Units 07/17/25  0445 07/16/25  1733   WBC Thousand/uL 9.50 9.37   HEMOGLOBIN g/dL 13.5 14.1   HEMATOCRIT % 39.7 41.7   PLATELETS Thousands/uL 137* 142*   SEGS PCT %  --  81*   LYMPHO PCT %  --  13*   MONO PCT %  --  5   EOS PCT %  --  1     Results from last 7 days   Lab Units 07/17/25  0445 07/16/25  1733   SODIUM mmol/L 134* 135   POTASSIUM mmol/L 3.8 3.9   CHLORIDE mmol/L 101 100   CO2 mmol/L 25 26   BUN mg/dL 16 26*   CREATININE mg/dL 0.69 1.42*   ANION GAP mmol/L 8 9   CALCIUM mg/dL 8.9 9.6   ALBUMIN g/dL  --  4.1   TOTAL BILIRUBIN mg/dL  --  0.99   ALK PHOS U/L  --  60   ALT U/L  --  19   AST U/L   --  12*   GLUCOSE RANDOM mg/dL 154* 172*     Results from last 7 days   Lab Units 07/16/25  1733   INR  1.19     Results from last 7 days   Lab Units 07/17/25  1141 07/16/25  2131   POC GLUCOSE mg/dl 215* 123     Results from last 7 days   Lab Units 07/16/25  1733   LACTIC ACID mmol/L 1.4   PROCALCITONIN ng/ml 0.40*     Recent Cultures (last 7 days):   Results from last 7 days   Lab Units 07/16/25  1742 07/16/25  1733   BLOOD CULTURE  Received in Microbiology Lab. Culture in Progress. Received in Microbiology Lab. Culture in Progress.     Last 24 Hours Medication List:     Current Facility-Administered Medications:     acetaminophen (TYLENOL) tablet 650 mg, Q4H PRN    ceFAZolin (ANCEF) IVPB (premix in dextrose) 2,000 mg 50 mL, Q8H, Last Rate: 2,000 mg (07/17/25 1221)    docusate sodium (COLACE) capsule 100 mg, BID    insulin lispro (HumALOG/ADMELOG) 100 units/mL subcutaneous injection 1-5 Units, TID AC **AND** Fingerstick Glucose (POCT), TID AC    ondansetron (ZOFRAN) injection 4 mg, Q6H PRN    Administrative Statements   Today, Patient Was Seen By: Turner Clark PA-C    **Please Note: This note may have been constructed using a voice recognition system.**

## 2025-07-17 NOTE — PLAN OF CARE
Problem: PAIN - ADULT  Goal: Verbalizes/displays adequate comfort level or baseline comfort level  Description: Interventions:  - Encourage patient to monitor pain and request assistance  - Assess pain using appropriate pain scale  - Administer analgesics as ordered based on type and severity of pain and evaluate response  - Implement non-pharmacological measures as appropriate and evaluate response  - Consider cultural and social influences on pain and pain management  - Notify physician/advanced practitioner if interventions unsuccessful or patient reports new pain  - Educate patient/family on pain management process including their role and importance of  reporting pain   - Provide non-pharmacologic/complimentary pain relief interventions  Outcome: Progressing     Problem: INFECTION - ADULT  Goal: Absence or prevention of progression during hospitalization  Description: INTERVENTIONS:  - Assess and monitor for signs and symptoms of infection  - Monitor lab/diagnostic results  - Monitor all insertion sites, i.e. indwelling lines, tubes, and drains  - Monitor endotracheal if appropriate and nasal secretions for changes in amount and color  - [place] appropriate cooling/warming therapies per order  - Administer medications as ordered  - Instruct and encourage patient and family to use good hand hygiene technique  - Identify and instruct in appropriate isolation precautions for identified infection/condition  Outcome: Progressing  Goal: Absence of fever/infection during neutropenic period  Description: INTERVENTIONS:  - Monitor WBC  - Perform strict hand hygiene  - Limit to healthy visitors only  - No plants, dried, fresh or silk flowers with luna in patient room  Outcome: Progressing     Problem: SAFETY ADULT  Goal: Maintain or return to baseline ADL function  Description: INTERVENTIONS:  -  Assess patient's ability to carry out ADLs; assess patient's baseline for ADL function and identify physical deficits  which impact ability to perform ADLs (bathing, care of mouth/teeth, toileting, grooming, dressing, etc.)  - Assess/evaluate cause of self-care deficits   - Assess range of motion  - Assess patient's mobility; develop plan if impaired  - Assess patient's need for assistive devices and provide as appropriate  - Encourage maximum independence but intervene and supervise when necessary  - Involve family in performance of ADLs  - Assess for home care needs following discharge   - Consider OT consult to assist with ADL evaluation and planning for discharge  - Provide patient education as appropriate  - Monitor functional capacity and physical performance, use of AM PAC & JH-HLM   - Monitor gait, balance and fatigue with ambulation    Outcome: Progressing     Problem: DISCHARGE PLANNING  Goal: Discharge to home or other facility with appropriate resources  Description: INTERVENTIONS:  - Identify barriers to discharge w/patient and caregiver  - Arrange for needed discharge resources and transportation as appropriate  - Identify discharge learning needs (meds, wound care, etc.)  - Arrange for interpretive services to assist at discharge as needed  - Refer to Case Management Department for coordinating discharge planning if the patient needs post-hospital services based on physician/advanced practitioner order or complex needs related to functional status, cognitive ability, or social support system  Outcome: Progressing     Problem: Knowledge Deficit  Goal: Patient/family/caregiver demonstrates understanding of disease process, treatment plan, medications, and discharge instructions  Description: Complete learning assessment and assess knowledge base.  Interventions:  - Provide teaching at level of understanding  - Provide teaching via preferred learning methods  Outcome: Progressing     Problem: MUSCULOSKELETAL - ADULT  Goal: Maintain or return mobility to safest level of function  Description: INTERVENTIONS:  - Assess  patient's ability to carry out ADLs; assess patient's baseline for ADL function and identify physical deficits which impact ability to perform ADLs (bathing, care of mouth/teeth, toileting, grooming, dressing, etc.)  - Assess/evaluate cause of self-care deficits   - Assess range of motion  - Assess patient's mobility  - Assess patient's need for assistive devices and provide as appropriate  - Encourage maximum independence but intervene and supervise when necessary  - Involve family in performance of ADLs  - Assess for home care needs following discharge   - Consider OT consult to assist with ADL evaluation and planning for discharge  - Provide patient education as appropriate  Outcome: Progressing

## 2025-07-17 NOTE — PLAN OF CARE
Problem: PAIN - ADULT  Goal: Verbalizes/displays adequate comfort level or baseline comfort level  Description: Interventions:  - Encourage patient to monitor pain and request assistance  - Assess pain using appropriate pain scale  - Administer analgesics as ordered based on type and severity of pain and evaluate response  - Implement non-pharmacological measures as appropriate and evaluate response  - Consider cultural and social influences on pain and pain management  - Notify physician/advanced practitioner if interventions unsuccessful or patient reports new pain  - Educate patient/family on pain management process including their role and importance of  reporting pain   - Provide non-pharmacologic/complimentary pain relief interventions  Outcome: Progressing     Problem: INFECTION - ADULT  Goal: Absence or prevention of progression during hospitalization  Description: INTERVENTIONS:  - Assess and monitor for signs and symptoms of infection  - Monitor lab/diagnostic results  - Monitor all insertion sites, i.e. indwelling lines, tubes, and drains  - Monitor endotracheal if appropriate and nasal secretions for changes in amount and color  - [place] appropriate cooling/warming therapies per order  - Administer medications as ordered  - Instruct and encourage patient and family to use good hand hygiene technique  - Identify and instruct in appropriate isolation precautions for identified infection/condition  Outcome: Progressing  Goal: Absence of fever/infection during neutropenic period  Description: INTERVENTIONS:  - Monitor WBC  - Perform strict hand hygiene  - Limit to healthy visitors only  - No plants, dried, fresh or silk flowers with luna in patient room  Outcome: Progressing     Problem: SAFETY ADULT  Goal: Maintain or return to baseline ADL function  Description: INTERVENTIONS:  -  Assess patient's ability to carry out ADLs; assess patient's baseline for ADL function and identify physical deficits  which impact ability to perform ADLs (bathing, care of mouth/teeth, toileting, grooming, dressing, etc.)  - Assess/evaluate cause of self-care deficits   - Assess range of motion  - Assess patient's mobility; develop plan if impaired  - Assess patient's need for assistive devices and provide as appropriate  - Encourage maximum independence but intervene and supervise when necessary  - Involve family in performance of ADLs  - Assess for home care needs following discharge   - Consider OT consult to assist with ADL evaluation and planning for discharge  - Provide patient education as appropriate  - Monitor functional capacity and physical performance, use of AM PAC & JH-HLM   - Monitor gait, balance and fatigue with ambulation    Outcome: Progressing     Problem: DISCHARGE PLANNING  Goal: Discharge to home or other facility with appropriate resources  Description: INTERVENTIONS:  - Identify barriers to discharge w/patient and caregiver  - Arrange for needed discharge resources and transportation as appropriate  - Identify discharge learning needs (meds, wound care, etc.)  - Arrange for interpretive services to assist at discharge as needed  - Refer to Case Management Department for coordinating discharge planning if the patient needs post-hospital services based on physician/advanced practitioner order or complex needs related to functional status, cognitive ability, or social support system  Outcome: Progressing     Problem: Knowledge Deficit  Goal: Patient/family/caregiver demonstrates understanding of disease process, treatment plan, medications, and discharge instructions  Description: Complete learning assessment and assess knowledge base.  Interventions:  - Provide teaching at level of understanding  - Provide teaching via preferred learning methods  Outcome: Progressing     Problem: MUSCULOSKELETAL - ADULT  Goal: Maintain or return mobility to safest level of function  Description: INTERVENTIONS:  - Assess  patient's ability to carry out ADLs; assess patient's baseline for ADL function and identify physical deficits which impact ability to perform ADLs (bathing, care of mouth/teeth, toileting, grooming, dressing, etc.)  - Assess/evaluate cause of self-care deficits   - Assess range of motion  - Assess patient's mobility  - Assess patient's need for assistive devices and provide as appropriate  - Encourage maximum independence but intervene and supervise when necessary  - Involve family in performance of ADLs  - Assess for home care needs following discharge   - Consider OT consult to assist with ADL evaluation and planning for discharge  - Provide patient education as appropriate  Outcome: Progressing

## 2025-07-17 NOTE — ASSESSMENT & PLAN NOTE
Lab Results   Component Value Date    EGFR 107 07/17/2025    EGFR 55 07/16/2025    CREATININE 0.69 07/17/2025    CREATININE 1.42 (H) 07/16/2025   Suspect patient had LISA on possible CKD on admission  Was having vomiting which has now resolved  Responded and Cr normalized after fluids  Tolerating diet.  Denies any difficulty voiding.  Check a UA, monitor off fluids

## 2025-07-17 NOTE — ASSESSMENT & PLAN NOTE
"No results found for: \"HGBA1C\"  Home medications include metformin  mg daily  Holding home medication, obtaining A1c  Place on sliding scale insulin with Accu-Cheks  "
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
## 2025-07-17 NOTE — ASSESSMENT & PLAN NOTE
"No results found for: \"HGBA1C\"    Recent Labs     07/16/25  2131   POCGLU 123   This is a risk factor for infection.  - Maintain euglycemia to improve WBC function   - Management per primary team  "

## 2025-07-17 NOTE — CONSULTS
"Consultation - Infectious Disease   Name: Gerdys Antonio ReyesCuevas 55 y.o. male I MRN: [id number]  Unit/Bed#: [place] [address] -02 I Date of Admission: 7/16/2025   Date of Service: 7/17/2025 I Hospital Day: 1   Inpatient consult to Infectious Diseases  Consult performed by: Parminder Augustine MD  Consult ordered by: Turner Clark PA-C        Physician Requesting Evaluation: Aleta Moy MD   Reason for Evaluation / Principal Problem: RLE rash      Assessment & Plan  Cellulitis of right lower extremity  Has appearance of Streptococcal infection, with streaking erythema and inguinal pain.  Risk factors are diabetes and cracked skin between the toes of his right foot (may be tinea pedis).  He is feeling better than Monday, when this started.  Denies history of skin infections or diabetic wounds.  Patient is hemodynamically stable, afebrile, and with normal WBC count.  He had subjective fevers prior to admission.  CT just noting diffuse skin thickening without deeper or drainable infection.  There is reactive right inguinal LAD.    - Change antibiotic to cefazolin 2 g every 8 hours  - Monitor BMP and CBCD to assess for developing medication toxicities and treatment response  - Follow up blood cultures from 7/16  - Serial leg exams  - Use topical antifungal for possible tinea pedis, which can be a risk factor for cellulitis  - Emollient creams to dry/cracked skin  - Anticipate quick transition to PO antibiotics if improving  - There are no vesicular lesions or dermatomal distribution to erythema to suggest Zoster; nothing to swab.  Type 2 diabetes mellitus without complication, without long-term current use of insulin ([facility])  No results found for: \"HGBA1C\"    Recent Labs     07/16/25  2131   POCGLU 123   This is a risk factor for infection.  - Maintain euglycemia to improve WBC function   - Management per primary team  Stage 3a chronic kidney disease (HCC)  Lab Results   Component Value Date    EGFR 107 07/17/2025    " EGFR 55 07/16/2025    CREATININE 0.69 07/17/2025    CREATININE 1.42 (H) 07/16/2025     Estimated Creatinine Clearance: 145.1 mL/min (by C-G formula based on SCr of 0.69 mg/dL).   - Dose adjust antimicrobials as needed   Thrombocytopenia (HCC)  May be in the setting of infection.  No baseline to establish chronicity.  No DVT noted in lower extremities.  - Monitor on treatment above   Class 1 obesity with serious comorbidity and body mass index (BMI) of 31.0 to 31.9 in adult  This can impact antibiotic dosing.   - Use high dose cefazolin as above       I have discussed with Turner Benavides the above plan to change to cefazolin. She agrees with the plan.    Antibiotics:  Antibiotic day #2  Cefazolin day #1    History of Present Illness     [facility] Interpretor [id number]    Yvrose Antonio ReyesCuevas is a 55 y.o. man with PMH of HLD and T2DM who presented on 7/16/2025 with worsening RLE pain.  He first noted inguinal pain on 7/14, then noted progressive erythema developing distally.  He denies any trauma.  He was feeling subjective fevers/chills.  No history of skin infections or diabetic wounds.  He has no Ortho hardware in his leg.  He reports feeling better since onset of this issue.      A complete review of systems is negative other than that noted in the HPI.    Medical History Review: I have reviewed the patient's PMH, PSH, Social History, Family History, Meds, and Allergies     Objective :  Temp:  [97.9 °F (36.6 °C)-99.8 °F (37.7 °C)] 99.8 °F (37.7 °C)  HR:  [] 105  BP: (134-173)/() 141/86  Resp:  [16-18] 18  SpO2:  [96 %-100 %] 98 %  O2 Device: None (Room air)    General:  No acute distress  Psychiatric:  Awake and alert  Pulmonary:  Normal respiratory excursion without accessory muscle use  Abdomen:  Soft, nontender  Extremities:  No edema  Skin:  Streaking erythema up the RLE; skin is tense/tender below the knee  Scaly, dry skin between right toes        Lab Results: I have reviewed the following  results:  Results from last 7 days   Lab Units 07/17/25  0445 07/16/25  1733   WBC Thousand/uL 9.50 9.37   HEMOGLOBIN g/dL 13.5 14.1   PLATELETS Thousands/uL 137* 142*     Results from last 7 days   Lab Units 07/17/25  0445 07/16/25  1733   SODIUM mmol/L 134* 135   POTASSIUM mmol/L 3.8 3.9   CHLORIDE mmol/L 101 100   CO2 mmol/L 25 26   BUN mg/dL 16 26*   CREATININE mg/dL 0.69 1.42*   EGFR ml/min/1.73sq m 107 55   CALCIUM mg/dL 8.9 9.6   AST U/L  --  12*   ALT U/L  --  19   ALK PHOS U/L  --  60   ALBUMIN g/dL  --  4.1     Results from last 7 days   Lab Units 07/16/25  1742 07/16/25  1733   BLOOD CULTURE  Received in Microbiology Lab. Culture in Progress. Received in Microbiology Lab. Culture in Progress.     Results from last 7 days   Lab Units 07/16/25  1733   PROCALCITONIN ng/ml 0.40*                   Imaging Results Review: I personally reviewed the following image studies in PACS and associated radiology reports: CT RLE. My interpretation of the radiology images/reports is: There is right inguinal LAD, and skin thickening below the knee.  Other Study Results Review: No additional pertinent studies reviewed.    Parminder Augustine MD  [facility]

## 2025-07-17 NOTE — ASSESSMENT & PLAN NOTE
May be in the setting of infection.  No baseline to establish chronicity.  No DVT noted in lower extremities.  - Monitor on treatment above

## 2025-07-17 NOTE — ASSESSMENT & PLAN NOTE
Has appearance of Streptococcal infection, with streaking erythema and inguinal pain.  Risk factors are diabetes and cracked skin between the toes of his right foot (may be tinea pedis).  He is feeling better than Monday, when this started.  Denies history of skin infections or diabetic wounds.  Patient is hemodynamically stable, afebrile, and with normal WBC count.  He had subjective fevers prior to admission.  CT just noting diffuse skin thickening without deeper or drainable infection.  There is reactive right inguinal LAD.    - Change antibiotic to cefazolin 2 g every 8 hours  - Monitor BMP and CBCD to assess for developing medication toxicities and treatment response  - Follow up blood cultures from 7/16  - Serial leg exams  - Use topical antifungal for possible tinea pedis, which can be a risk factor for cellulitis  - Emollient creams to dry/cracked skin  - Anticipate quick transition to PO antibiotics if improving  - There are no vesicular lesions or dermatomal distribution to erythema to suggest Zoster; nothing to swab.

## 2025-07-17 NOTE — UTILIZATION REVIEW
Initial Clinical Review    Admission: Date/Time/Statement:   Admission Orders (From admission, onward)       Ordered        07/16/25 1953  INPATIENT ADMISSION  Once                          Orders Placed This Encounter   Procedures    INPATIENT ADMISSION     Standing Status:   Standing     Number of Occurrences:   1     Level of Care:   Med Surg [16]     Estimated length of stay:   More than 2 Midnights     Certification:   I certify that inpatient services are medically necessary for this patient for a duration of greater than two midnights. See H&P and MD Progress Notes for additional information about the patient's course of treatment.     ED Arrival Information       Expected   -    Arrival   7/16/2025 16:05    Acuity   Urgent              Means of arrival   Walk-In    Escorted by   Self    Service   Hospitalist    Admission type   Emergency              Arrival complaint   leg pain             Chief Complaint   Patient presents with    Leg Swelling     C/o RLE swelling and pain since Monday, also reports fevers.         Initial Presentation: 55 y.o. male presents to the ED from home with c/o RLE redness, pain, swelling x 3 days with fevers, diaphoresis. PMH: DM, HLD, elevated BP off meds.  In the ED tachycardia, pain 7/10, afebrile.  Treated with IV antibiotics, IV Toradol x 1.  Labs - elevated BUN/CR, procal, glucose.  Imaging - RLE cellulitis, R inguinal adenopathy, poos reactive.  On exam RLE edema, toes to knees, erythema RLE. Pt developed fever in the evening.   Admitted to INPATIENT status with Cellulitis RLE, thrombocytopenia - PLAN: blood cultures, IV antibiotics, IV fluids, hold oral DM meds, SSI cover.      Anticipated Length of Stay/Certification Statement: Patient will be admitted on an inpatient basis with an anticipated length of stay of greater than 2 midnights secondary to cellulitis, requiring IV antibiotics.     Date: 7/17   Day 2:    Cellulitis RLE, thrombocytopenia - continue IV antibiotics,  ID consult, blood cultures, feels improvement, no fevers. On exam  RLE erythematous skin changes with some streaking/raised skin. No vesicular lesions.  No rash or wound above right knee, back.  Normal range of motion of RLE.  Calf compartment soft to palpation, tender and warm to touch. Dry cracked skin on feet without open wound. Old scars to anterior RLE noted, + tenderness, pain RLE, buring pain,     7/17 ID Consult - Cellulitis RLE, poss strep infection - change antibiotics to IV Cefazolin q 8 hr, blood cultures, serial leg exams, topical antifungals.  On exam Streaking erythema up the RLE; skin is tense/tender below the knee.      ED Treatment-Medication Administration from 07/16/2025 1605 to 07/16/2025 2056         Date/Time Order Dose Route Action     07/16/2025 1747 ceftriaxone (ROCEPHIN) 2 g/50 mL in dextrose IVPB 2,000 mg Intravenous New Bag     07/16/2025 1745 ketorolac (TORADOL) injection 15 mg 15 mg Intravenous Given     07/16/2025 1835 iohexol (OMNIPAQUE) 350 MG/ML injection (MULTI-DOSE) 100 mL 100 mL Intravenous Given            Scheduled Medications:  acetaminophen, 1,000 mg, Intravenous, Once  [START ON 7/18/2025] atorvastatin, 80 mg, Oral, Daily With Dinner  cefazolin, 2,000 mg, Intravenous, Q8H  clotrimazole, , Topical, BID  docusate sodium, 100 mg, Oral, BID  insulin lispro, 1-5 Units, Subcutaneous, 4x Daily (AC & HS)      Continuous IV Infusions:    IV NSS @ 100 cc/hr - d/c 7/17 AM     PRN Meds:  acetaminophen, 650 mg, Oral, Q4H PRN -x 1 7/17  ondansetron, 4 mg, Intravenous, Q6H PRN - x 2 7/17      ED Triage Vitals   Temperature Pulse Respirations Blood Pressure SpO2 Pain Score   07/16/25 1610 07/16/25 1610 07/16/25 1610 07/16/25 1610 07/16/25 1610 07/16/25 1745   97.9 °F (36.6 °C) 101 18 134/76 99 % 7     Weight (last 2 days)       Date/Time Weight    07/16/25 21:00:12 100 (220.46)    07/16/25 1610 101 (222.66)            Vital Signs (last 3 days)       Date/Time Temp Pulse Resp BP MAP (mmHg)  SpO2 O2 Device Patient Position - Orthostatic VS Pain    07/17/25 19:11:06 -- 113 -- 160/93 115 96 % -- -- --    07/17/25 19:08:03 102 °F (38.9 °C) -- -- -- -- -- -- -- --    07/17/25 15:22:51 100 °F (37.8 °C) 97 18 153/83 106 95 % -- -- --    07/17/25 1300 -- -- -- -- -- -- None (Room air) -- No Pain    07/17/25 0942 -- -- -- -- -- -- -- -- 4    07/17/25 07:49:43 99.8 °F (37.7 °C) 105 -- 141/86 104 98 % -- -- --    07/17/25 0100 -- 104 -- -- -- -- -- -- --    07/17/25 0000 -- 105 -- -- -- -- -- -- --    07/16/25 2300 -- 110 -- -- -- -- -- -- --    07/16/25 2200 99.6 °F (37.6 °C) 105 -- 162/99 120 -- -- -- --    07/16/25 21:57:35 99.6 °F (37.6 °C) 104 -- 162/99 120 97 % -- -- --    07/16/25 21:55:32 99.6 °F (37.6 °C) 107 -- 162/98 119 96 % -- -- --    07/16/25 2141 -- -- -- -- -- -- -- -- 4 07/16/25 21:00:12 99 °F (37.2 °C) 99 18 173/101 125 99 % None (Room air) Lying --    07/16/25 2000 -- -- -- -- -- -- None (Room air) -- --    07/16/25 1908 -- 96 16 139/83 105 100 % None (Room air) Lying --    07/16/25 1906 -- -- -- -- -- -- -- -- 5    07/16/25 1745 -- -- -- -- -- -- -- -- 7    07/16/25 1610 97.9 °F (36.6 °C) 101 18 134/76 -- 99 % None (Room air) Sitting --              Pertinent Labs/Diagnostic Test Results:   Radiology:  VAS lower limb venous duplex study, unilateral/limited   Final Interpretation by Shreya Ojeda DO (07/16 2141)   RIGHT LOWER LIMB   There is no evidence of acute or chronic deep vein thrombosis.   There is no evidence of superficial thrombophlebitis.   Doppler evaluation shows a normal response to augmentation maneuvers.   Popliteal, posterior tibial and anterior tibial arterial Doppler waveforms are triphasic.   Tech Note:   There is an echogenic non-vascularized structure located in the right inguinal region measuring approximately 3.47 cm which may be suggestive of enlarged lymphatic channels.          LEFT LOWER LIMB LIMITED   There is no evidence of thrombus in the common femoral  vein.    Doppler evaluation shows a normal response to augmentation maneuvers.            CT lower extremity w contrast right   Final Interpretation by Erick Henderson MD (07/16 1914)      Circumferential subcutaneous edema and skin thickening of the right lower extremity extending from the knee through the ankle, which can be seen with cellulitis. No fluid collection. No soft tissue gas.      Right inguinal adenopathy, which may be reactive.         Workstation performed: [id number]           Cardiology:  No orders to display     GI:  No orders to display           Results from last 7 days   Lab Units 07/17/25  0445 07/16/25  1733   WBC Thousand/uL 9.50 9.37   HEMOGLOBIN g/dL 13.5 14.1   HEMATOCRIT % 39.7 41.7   PLATELETS Thousands/uL 137* 142*   TOTAL NEUT ABS Thousands/µL  --  7.43         Results from last 7 days   Lab Units 07/17/25  0445 07/16/25  1733   SODIUM mmol/L 134* 135   POTASSIUM mmol/L 3.8 3.9   CHLORIDE mmol/L 101 100   CO2 mmol/L 25 26   ANION GAP mmol/L 8 9   BUN mg/dL 16 26*   CREATININE mg/dL 0.69 1.42*   EGFR ml/min/1.73sq m 107 55   CALCIUM mg/dL 8.9 9.6     Results from last 7 days   Lab Units 07/16/25  1733   AST U/L 12*   ALT U/L 19   ALK PHOS U/L 60   TOTAL PROTEIN g/dL 7.7   ALBUMIN g/dL 4.1   TOTAL BILIRUBIN mg/dL 0.99     Results from last 7 days   Lab Units 07/17/25  1605 07/17/25  1141 07/16/25  2131   POC GLUCOSE mg/dl 208* 215* 123     Results from last 7 days   Lab Units 07/17/25  0445 07/16/25  1733   GLUCOSE RANDOM mg/dL 154* 172*         Results from last 7 days   Lab Units 07/17/25  0445   HEMOGLOBIN A1C % 8.0*   EAG mg/dl 183           Results from last 7 days   Lab Units 07/16/25  1733   PROTIME seconds 15.3*   INR  1.19   PTT seconds 33         Results from last 7 days   Lab Units 07/16/25  1733   PROCALCITONIN ng/ml 0.40*     Results from last 7 days   Lab Units 07/16/25  1733   LACTIC ACID mmol/L 1.4       Results from last 7 days   Lab Units 07/16/25  1742  07/16/25  1733   BLOOD CULTURE  Received in Microbiology Lab. Culture in Progress. Received in Microbiology Lab. Culture in Progress.       Past Medical History[1]  Present on Admission:  **None**      Admitting Diagnosis: Leg pain [M79.606]  Reactive lymphadenopathy [R59.9]  Cellulitis of right lower extremity from knee to ankle [L03.115]  Age/Sex: 55 y.o. male    Network Utilization Review Department  ATTENTION: Please call with any questions or concerns to [phone] and carefully listen to the prompts so that you are directed to the right person. All voicemails are confidential.   For Discharge needs, contact Care Management DC Support Team at [phone] opt. 2  Send all requests for admission clinical reviews, approved or denied determinations and any other requests to dedicated fax number below belonging to the campus where the patient is receiving treatment. List of dedicated fax numbers for the Facilities:  FACILITY NAME UR FAX NUMBER   ADMISSION DENIALS (Administrative/Medical Necessity) [phone]   DISCHARGE SUPPORT TEAM (NETWORK) [phone]   PARENT CHILD HEALTH (Maternity/NICU/Pediatrics) [phone]   [facility] [phone]   [facility] [phone]   [facility] [phone]   [facility] [phone]   [facility] [phone]   [facility] [phone]   [facility] [phone]   [facility] [phone]   [facility] [phone]   [facility] [phone]   [facility] [phone]   [facility] [phone]              [1]   Past Medical History:  Diagnosis Date    Diabetes mellitus (HCC)     Hyperlipidemia

## 2025-07-18 ENCOUNTER — APPOINTMENT (INPATIENT)
Dept: CT IMAGING | Facility: HOSPITAL | Age: 55
DRG: 383 | End: 2025-07-18
Payer: MEDICAID

## 2025-07-18 PROBLEM — N18.31 STAGE 3A CHRONIC KIDNEY DISEASE (HCC): Status: RESOLVED | Noted: 2025-07-16 | Resolved: 2025-07-18

## 2025-07-18 PROBLEM — D69.6 THROMBOCYTOPENIA (HCC): Status: RESOLVED | Noted: 2025-07-16 | Resolved: 2025-07-18

## 2025-07-18 PROBLEM — N17.9 AKI (ACUTE KIDNEY INJURY) (HCC): Status: ACTIVE | Noted: 2025-07-18

## 2025-07-18 PROBLEM — R10.13 EPIGASTRIC PAIN: Status: ACTIVE | Noted: 2025-07-18

## 2025-07-18 LAB
ANION GAP SERPL CALCULATED.3IONS-SCNC: 12 MMOL/L (ref 4–13)
BUN SERPL-MCNC: 11 MG/DL (ref 5–25)
CALCIUM SERPL-MCNC: 9.5 MG/DL (ref 8.4–10.2)
CHLORIDE SERPL-SCNC: 98 MMOL/L (ref 96–108)
CO2 SERPL-SCNC: 27 MMOL/L (ref 21–32)
CREAT SERPL-MCNC: 0.65 MG/DL (ref 0.6–1.3)
ERYTHROCYTE [DISTWIDTH] IN BLOOD BY AUTOMATED COUNT: 12.4 % (ref 11.6–15.1)
GFR SERPL CREATININE-BSD FRML MDRD: 110 ML/MIN/1.73SQ M
GLUCOSE SERPL-MCNC: 149 MG/DL (ref 65–140)
GLUCOSE SERPL-MCNC: 166 MG/DL (ref 65–140)
GLUCOSE SERPL-MCNC: 173 MG/DL (ref 65–140)
GLUCOSE SERPL-MCNC: 177 MG/DL (ref 65–140)
GLUCOSE SERPL-MCNC: 182 MG/DL (ref 65–140)
HCT VFR BLD AUTO: 41.3 % (ref 36.5–49.3)
HGB BLD-MCNC: 13.8 G/DL (ref 12–17)
LIPASE SERPL-CCNC: 28 U/L (ref 11–82)
MCH RBC QN AUTO: 27.7 PG (ref 26.8–34.3)
MCHC RBC AUTO-ENTMCNC: 33.4 G/DL (ref 31.4–37.4)
MCV RBC AUTO: 83 FL (ref 82–98)
MRSA NOSE QL CULT: NORMAL
PLATELET # BLD AUTO: 168 THOUSANDS/UL (ref 149–390)
PMV BLD AUTO: 10.1 FL (ref 8.9–12.7)
POTASSIUM SERPL-SCNC: 3.5 MMOL/L (ref 3.5–5.3)
RBC # BLD AUTO: 4.99 MILLION/UL (ref 3.88–5.62)
SODIUM SERPL-SCNC: 137 MMOL/L (ref 135–147)
WBC # BLD AUTO: 8.82 THOUSAND/UL (ref 4.31–10.16)

## 2025-07-18 PROCEDURE — 83690 ASSAY OF LIPASE: CPT

## 2025-07-18 PROCEDURE — 99232 SBSQ HOSP IP/OBS MODERATE 35: CPT | Performed by: INTERNAL MEDICINE

## 2025-07-18 PROCEDURE — 99232 SBSQ HOSP IP/OBS MODERATE 35: CPT

## 2025-07-18 PROCEDURE — NC001 PR NO CHARGE: Performed by: INTERNAL MEDICINE

## 2025-07-18 PROCEDURE — 80048 BASIC METABOLIC PNL TOTAL CA: CPT

## 2025-07-18 PROCEDURE — 82948 REAGENT STRIP/BLOOD GLUCOSE: CPT

## 2025-07-18 PROCEDURE — 74177 CT ABD & PELVIS W/CONTRAST: CPT

## 2025-07-18 PROCEDURE — 85027 COMPLETE CBC AUTOMATED: CPT

## 2025-07-18 PROCEDURE — G0545 PR INHERENT VISIT TO INPT: HCPCS | Performed by: INTERNAL MEDICINE

## 2025-07-18 RX ORDER — POTASSIUM CHLORIDE 20MEQ/15ML
20 LIQUID (ML) ORAL ONCE
Status: COMPLETED | OUTPATIENT
Start: 2025-07-18 | End: 2025-07-18

## 2025-07-18 RX ORDER — MAGNESIUM HYDROXIDE/ALUMINUM HYDROXICE/SIMETHICONE 120; 1200; 1200 MG/30ML; MG/30ML; MG/30ML
30 SUSPENSION ORAL EVERY 4 HOURS PRN
Status: DISCONTINUED | OUTPATIENT
Start: 2025-07-18 | End: 2025-07-20 | Stop reason: HOSPADM

## 2025-07-18 RX ORDER — PANTOPRAZOLE SODIUM 40 MG/10ML
40 INJECTION, POWDER, LYOPHILIZED, FOR SOLUTION INTRAVENOUS EVERY 12 HOURS SCHEDULED
Status: DISCONTINUED | OUTPATIENT
Start: 2025-07-18 | End: 2025-07-20 | Stop reason: HOSPADM

## 2025-07-18 RX ORDER — SODIUM CHLORIDE 9 MG/ML
100 INJECTION, SOLUTION INTRAVENOUS CONTINUOUS
Status: DISCONTINUED | OUTPATIENT
Start: 2025-07-18 | End: 2025-07-19

## 2025-07-18 RX ADMIN — INSULIN LISPRO 1 UNITS: 100 INJECTION, SOLUTION INTRAVENOUS; SUBCUTANEOUS at 09:46

## 2025-07-18 RX ADMIN — ONDANSETRON 4 MG: 2 INJECTION INTRAMUSCULAR; INTRAVENOUS at 10:18

## 2025-07-18 RX ADMIN — CEFAZOLIN SODIUM 2000 MG: 2 SOLUTION INTRAVENOUS at 11:42

## 2025-07-18 RX ADMIN — CEFAZOLIN SODIUM 2000 MG: 2 SOLUTION INTRAVENOUS at 04:31

## 2025-07-18 RX ADMIN — DOCUSATE SODIUM 100 MG: 100 CAPSULE, LIQUID FILLED ORAL at 09:46

## 2025-07-18 RX ADMIN — INSULIN LISPRO 1 UNITS: 100 INJECTION, SOLUTION INTRAVENOUS; SUBCUTANEOUS at 23:13

## 2025-07-18 RX ADMIN — SODIUM CHLORIDE 100 ML/HR: 0.9 INJECTION, SOLUTION INTRAVENOUS at 11:41

## 2025-07-18 RX ADMIN — INSULIN LISPRO 1 UNITS: 100 INJECTION, SOLUTION INTRAVENOUS; SUBCUTANEOUS at 11:42

## 2025-07-18 RX ADMIN — POTASSIUM CHLORIDE 20 MEQ: 20 SOLUTION ORAL at 16:31

## 2025-07-18 RX ADMIN — PANTOPRAZOLE SODIUM 40 MG: 40 INJECTION, POWDER, FOR SOLUTION INTRAVENOUS at 23:13

## 2025-07-18 RX ADMIN — IOHEXOL 100 ML: 350 INJECTION, SOLUTION INTRAVENOUS at 12:13

## 2025-07-18 RX ADMIN — CEFAZOLIN SODIUM 2000 MG: 2 SOLUTION INTRAVENOUS at 19:44

## 2025-07-18 RX ADMIN — CLOTRIMAZOLE: 1 CREAM TOPICAL at 09:46

## 2025-07-18 RX ADMIN — CLOTRIMAZOLE: 1 CREAM TOPICAL at 16:35

## 2025-07-18 RX ADMIN — DOCUSATE SODIUM 100 MG: 100 CAPSULE, LIQUID FILLED ORAL at 16:32

## 2025-07-18 RX ADMIN — PANTOPRAZOLE SODIUM 40 MG: 40 INJECTION, POWDER, FOR SOLUTION INTRAVENOUS at 11:41

## 2025-07-18 RX ADMIN — ATORVASTATIN CALCIUM 80 MG: 80 TABLET, FILM COATED ORAL at 16:32

## 2025-07-18 RX ADMIN — INSULIN LISPRO 1 UNITS: 100 INJECTION, SOLUTION INTRAVENOUS; SUBCUTANEOUS at 16:32

## 2025-07-18 RX ADMIN — ALUMINUM HYDROXIDE, MAGNESIUM HYDROXIDE, AND SIMETHICONE 30 ML: 200; 200; 20 SUSPENSION ORAL at 10:14

## 2025-07-18 NOTE — ASSESSMENT & PLAN NOTE
Lab Results   Component Value Date    HGBA1C 8.0 (H) 07/17/2025     Recent Labs     07/17/25  1141 07/17/25  1605 07/17/25  2158 07/18/25  0713   POCGLU 215* 208* 164* 173*   This is a risk factor for infection.  - Maintain euglycemia to improve WBC function   - Management per primary team

## 2025-07-18 NOTE — ASSESSMENT & PLAN NOTE
Has appearance of Streptococcal infection, with streaking erythema and inguinal pain.  Risk factors are diabetes and cracked skin between the toes of his right foot (may be tinea pedis).  He is feeling better than when this started.  Denies history of skin infections or diabetic wounds.  Patient is hemodynamically stable, with normal WBC count, but had 1x temp 102 on 7/17.  He had subjective fevers prior to admission.  CT just noting diffuse skin thickening without deeper or drainable infection.  There is reactive right inguinal LAD.    - Continue cefazolin 2 g every 8 hours; erythema improving  - Monitor BMP and CBCD to assess for developing medication toxicities and treatment response  - Follow up blood cultures from 7/16  - Serial leg exams  - Use topical antifungal for possible tinea pedis, which can be a risk factor for cellulitis  - Emollient creams to dry/cracked skin  - Anticipate quick transition to PO cefadroxil 1 g BID on 7/20, if afebrile and otherwise still improving  - Hope to treat x10 days total (end 7/25/25)

## 2025-07-18 NOTE — ASSESSMENT & PLAN NOTE
Presenting with worsening RLE pain, fevers and chills..  Started with right inguinal pain, then progressive erythema.  No trauma, history of skin infection or diabetic wounds.  CT of the lower leg with circumferential subcutaneous edema and skin thickening, no soft tissue gas.  Right inguinal adenopathy which is reactive  Venous duplex negative  Appreciate infectious disease evaluation  Suspected with streptococcal infection due to streaking erythema  Continue IV cefazolin  Blood cultures negative at 24 hours  Continue to monitor fever curve - noted temperature of 102 yesterday evening  Continue serial leg monitoring  Continue topical antifungal and emollients to dry cracked skin on lower extremities which could be risk factor to cellulitis

## 2025-07-18 NOTE — PROGRESS NOTES
Progress Note - Hospitalist   Name: Gerdys Antonio ReyesCuevas 55 y.o. male I MRN: [id number]  Unit/Bed#: [place] [address] -01 I Date of Admission: 7/16/2025   Date of Service: 7/18/2025 I Hospital Day: 2    Assessment & Plan  Cellulitis of right lower extremity  Presenting with worsening RLE pain, fevers and chills..  Started with right inguinal pain, then progressive erythema.  No trauma, history of skin infection or diabetic wounds.  CT of the lower leg with circumferential subcutaneous edema and skin thickening, no soft tissue gas.  Right inguinal adenopathy which is reactive  Venous duplex negative  Appreciate infectious disease evaluation  Suspected with streptococcal infection due to streaking erythema  Continue IV cefazolin  Blood cultures negative at 24 hours  Continue to monitor fever curve - noted temperature of 102 yesterday evening  Continue serial leg monitoring  Continue topical antifungal and emollients to dry cracked skin on lower extremities which could be risk factor to cellulitis  Type 2 diabetes mellitus without complication, without long-term current use of insulin ([facility])  Lab Results   Component Value Date    HGBA1C 8.0 (H) 07/17/2025     Home medications include metformin  mg daily  Oral metformin on hold, continue sliding scale insulin with Accu-Cheks  No noted gap or uncontrolled hyperglycemia with infection at this time  LISA (acute kidney injury) ([facility])  Creatinine elevated 1.42 on admission, originally suspected due to LISA and possible CKD with longstanding diabetes although patient underwent IV fluids and LISA has now resolved, GFR above 100  Suspect prerenal cause in setting of intermittent emesis and poor oral intake  UA reviewed, grossly unremarkable  Denies difficulty voiding  Monitor with intermittent emesis, patient currently on IV fluids  Epigastric pain  With intermittent mid epigastric abdominal sharp pain over the past 2 days  Patient has been having intermittent nonbloody  emesis with improvement in abdominal discomfort  With 1 episode of emesis today, abdomen nontender on exam  No associated diarrhea, chest pain or radiation of pain  Patient reports rarely taking NSAIDs such as iburpofen or Advil  Does not drink daily, socially 1-2 beers on weekends  Trial Maalox, start IV PPI, Zofran prn  Will restart IV fluids, clear liquid diet  Check lipase, CT abdomen pelvis to rule out any gallbladder etiology, pancreatitis, gastritis  Thrombocytopenia (HCC) (Resolved: 7/18/2025)  Mild which may be in setting of infection, possible hemodilution.  No DVT in lower extremities  Not on AC/AP  Platelets normalized, 168  Class 1 obesity with serious comorbidity and body mass index (BMI) of 31.0 to 31.9 in adult  Noted contributes all aspects of care    VTE Pharmacologic Prophylaxis: VTE Score: 2 Low Risk (Score 0-2) - Encourage Ambulation.    Mobility:   Basic Mobility Inpatient Raw Score: 24  JH-HLM Goal: 8: Walk 250 feet or more  JH-HLM Achieved: 7: Walk 25 feet or more  JH-HLM Goal achieved. Continue to encourage appropriate mobility.    Patient Centered Rounds: I performed bedside rounds with nursing staff today.   Discussions with Specialists or Other Care Team Provider: Infectious Disease, CM    Education and Discussions with Family / Patient: patient    Current Length of Stay: 2 day(s)  Current Patient Status: Inpatient   Certification Statement: The patient will continue to require additional inpatient hospital stay due to antibiotics, epigastric pain work up, emesis, monitoring blood cultures  Discharge Plan: Anticipate discharge in 48 hrs to home.    Code Status: Level 1 - Full Code    Subjective   Patient seen and examined bedside with nursing staff who helped in [place] translation.  Patient had an episode of nonbloody vomiting this morning.  States he was having some sharp pain in his mid abdomen that radiated up and vomited, he now feels better.  Denies any abdominal pain.  Tolerating  antibiotics well without any diarrhea.  Denies any radiation of pain up into his shoulders or his back.  He does not report much of an appetite and states he had an episode of vomiting yesterday evening as well.  Denies any current chest pain, acid reflux or burping.  Reports rarely using NSAIDs.  Drinks 1-2 beers socially on the weekends but not daily.  Does state overall his leg is starting to feel better, still has some tenderness and swelling.  Patient did have a fever last night.  Agreeable to IV fluids, trialing some medications and getting a scan of his stomach.    Objective :  Temp:  [99.4 °F (37.4 °C)-102 °F (38.9 °C)] 99.4 °F (37.4 °C)  HR:  [] 90  BP: (139-160)/(64-93) 139/84  Resp:  [18] 18  SpO2:  [95 %-96 %] 96 %  O2 Device: None (Room air)    Body mass index is 31.63 kg/m².     Input and Output Summary (last 24 hours):     Intake/Output Summary (Last 24 hours) at 7/18/2025 1054  Last data filed at 7/17/2025 1352  Gross per 24 hour   Intake 240 ml   Output 700 ml   Net -460 ml       Physical Exam  Vitals and nursing note reviewed.   Constitutional:       General: He is not in acute distress.     Appearance: Normal appearance. He is obese. He is ill-appearing. He is not toxic-appearing or diaphoretic.     Cardiovascular:      Rate and Rhythm: Normal rate and regular rhythm.   Pulmonary:      Effort: Pulmonary effort is normal. No respiratory distress.      Breath sounds: Normal breath sounds.   Chest:      Chest wall: No tenderness.   Abdominal:      General: There is no distension.      Palpations: Abdomen is soft.      Tenderness: There is no abdominal tenderness. There is no guarding or rebound.      Comments: + BS.  Noted green-tan colored emesis bedside      Musculoskeletal:         General: Swelling and tenderness present.      Left lower leg: No edema.     Skin:     General: Skin is warm and dry.      Coloration: Skin is not jaundiced.      Findings: Erythema present.      Comments: ART  erythematous skin changes with some streaking/raised skin on anterior distal leg.  Calf compartment soft to palpation, tender and warm to touch.  Erythema overall improving.  Lotion noted on bilateral feet.  Right inguinal canal with erythematous streaking and some tenderness to palpation.     Neurological:      Mental Status: He is alert and oriented to person, place, and time. Mental status is at baseline.         Lab Results: I have reviewed the following results:   Results from last 7 days   Lab Units 07/18/25  0438 07/17/25 0445 07/16/25  1733   WBC Thousand/uL 8.82   < > 9.37   HEMOGLOBIN g/dL 13.8   < > 14.1   HEMATOCRIT % 41.3   < > 41.7   PLATELETS Thousands/uL 168   < > 142*   SEGS PCT %  --   --  81*   LYMPHO PCT %  --   --  13*   MONO PCT %  --   --  5   EOS PCT %  --   --  1    < > = values in this interval not displayed.     Results from last 7 days   Lab Units 07/18/25  0438 07/17/25 0445 07/16/25  1733   SODIUM mmol/L 137   < > 135   POTASSIUM mmol/L 3.5   < > 3.9   CHLORIDE mmol/L 98   < > 100   CO2 mmol/L 27   < > 26   BUN mg/dL 11   < > 26*   CREATININE mg/dL 0.65   < > 1.42*   ANION GAP mmol/L 12   < > 9   CALCIUM mg/dL 9.5   < > 9.6   ALBUMIN g/dL  --   --  4.1   TOTAL BILIRUBIN mg/dL  --   --  0.99   ALK PHOS U/L  --   --  60   ALT U/L  --   --  19   AST U/L  --   --  12*   GLUCOSE RANDOM mg/dL 149*   < > 172*    < > = values in this interval not displayed.     Results from last 7 days   Lab Units 07/16/25  1733   INR  1.19     Results from last 7 days   Lab Units 07/18/25  0713 07/17/25  2158 07/17/25  1605 07/17/25  1141 07/16/25  2131   POC GLUCOSE mg/dl 173* 164* 208* 215* 123     Results from last 7 days   Lab Units 07/17/25  0445   HEMOGLOBIN A1C % 8.0*     Results from last 7 days   Lab Units 07/16/25  1733   LACTIC ACID mmol/L 1.4   PROCALCITONIN ng/ml 0.40*     Recent Cultures (last 7 days):   Results from last 7 days   Lab Units 07/16/25  1742 07/16/25  1733   BLOOD CULTURE  No  Growth at 24 hrs. No Growth at 24 hrs.     Last 24 Hours Medication List:     Current Facility-Administered Medications:     acetaminophen (TYLENOL) tablet 650 mg, Q4H PRN    aluminum-magnesium hydroxide-simethicone (MAALOX) oral suspension 30 mL, Q4H PRN    atorvastatin (LIPITOR) tablet 80 mg, Daily With Dinner    ceFAZolin (ANCEF) IVPB (premix in dextrose) 2,000 mg 50 mL, Q8H, Last Rate: 2,000 mg (07/18/25 0431)    clotrimazole (LOTRIMIN) 1 % cream, BID    docusate sodium (COLACE) capsule 100 mg, BID    insulin lispro (HumALOG/ADMELOG) 100 units/mL subcutaneous injection 1-5 Units, 4x Daily (AC & HS) **AND** Fingerstick Glucose (POCT), 4x Daily AC and at bedtime    ondansetron (ZOFRAN) injection 4 mg, Q6H PRN    pantoprazole (PROTONIX) injection 40 mg, Q12H MINE    sodium chloride 0.9 % infusion, Continuous    Administrative Statements   Today, Patient Was Seen By: Turner Clark PA-C    **Please Note: This note may have been constructed using a voice recognition system.**

## 2025-07-18 NOTE — ASSESSMENT & PLAN NOTE
Normal lipase.  Patient reports moderate alcohol use.  Consider peptic ulcer.  - Management per primary team

## 2025-07-18 NOTE — PLAN OF CARE
Problem: PAIN - ADULT  Goal: Verbalizes/displays adequate comfort level or baseline comfort level  Description: Interventions:  - Encourage patient to monitor pain and request assistance  - Assess pain using appropriate pain scale  - Administer analgesics as ordered based on type and severity of pain and evaluate response  - Implement non-pharmacological measures as appropriate and evaluate response  - Consider cultural and social influences on pain and pain management  - Notify physician/advanced practitioner if interventions unsuccessful or patient reports new pain  - Educate patient/family on pain management process including their role and importance of  reporting pain   - Provide non-pharmacologic/complimentary pain relief interventions  Outcome: Progressing     Problem: INFECTION - ADULT  Goal: Absence or prevention of progression during hospitalization  Description: INTERVENTIONS:  - Assess and monitor for signs and symptoms of infection  - Monitor lab/diagnostic results  - Monitor all insertion sites, i.e. indwelling lines, tubes, and drains  - Monitor endotracheal if appropriate and nasal secretions for changes in amount and color  - [place] appropriate cooling/warming therapies per order  - Administer medications as ordered  - Instruct and encourage patient and family to use good hand hygiene technique  - Identify and instruct in appropriate isolation precautions for identified infection/condition  Outcome: Progressing  Goal: Absence of fever/infection during neutropenic period  Description: INTERVENTIONS:  - Monitor WBC  - Perform strict hand hygiene  - Limit to healthy visitors only  - No plants, dried, fresh or silk flowers with luna in patient room  Outcome: Completed     Problem: SAFETY ADULT  Goal: Maintain or return to baseline ADL function  Description: INTERVENTIONS:  -  Assess patient's ability to carry out ADLs; assess patient's baseline for ADL function and identify physical deficits which  impact ability to perform ADLs (bathing, care of mouth/teeth, toileting, grooming, dressing, etc.)  - Assess/evaluate cause of self-care deficits   - Assess range of motion  - Assess patient's mobility; develop plan if impaired  - Assess patient's need for assistive devices and provide as appropriate  - Encourage maximum independence but intervene and supervise when necessary  - Involve family in performance of ADLs  - Assess for home care needs following discharge   - Consider OT consult to assist with ADL evaluation and planning for discharge  - Provide patient education as appropriate  - Monitor functional capacity and physical performance, use of AM PAC & JH-HLM   - Monitor gait, balance and fatigue with ambulation    Outcome: Progressing     Problem: DISCHARGE PLANNING  Goal: Discharge to home or other facility with appropriate resources  Description: INTERVENTIONS:  - Identify barriers to discharge w/patient and caregiver  - Arrange for needed discharge resources and transportation as appropriate  - Identify discharge learning needs (meds, wound care, etc.)  - Arrange for interpretive services to assist at discharge as needed  - Refer to Case Management Department for coordinating discharge planning if the patient needs post-hospital services based on physician/advanced practitioner order or complex needs related to functional status, cognitive ability, or social support system  Outcome: Progressing     Problem: Knowledge Deficit  Goal: Patient/family/caregiver demonstrates understanding of disease process, treatment plan, medications, and discharge instructions  Description: Complete learning assessment and assess knowledge base.  Interventions:  - Provide teaching at level of understanding  - Provide teaching via preferred learning methods  Outcome: Progressing

## 2025-07-18 NOTE — ASSESSMENT & PLAN NOTE
Mild which may be in setting of infection, possible hemodilution.  No DVT in lower extremities  Not on AC/AP  Platelets normalized, 168

## 2025-07-18 NOTE — ASSESSMENT & PLAN NOTE
Creatinine elevated 1.42 on admission, originally suspected due to LISA and possible CKD with longstanding diabetes although patient underwent IV fluids and LISA has now resolved, GFR above 100  Suspect prerenal cause in setting of intermittent emesis and poor oral intake  UA reviewed, grossly unremarkable  Denies difficulty voiding  Monitor with intermittent emesis, patient currently on IV fluids

## 2025-07-18 NOTE — PROGRESS NOTES
Progress Note - Infectious Disease   Name: Gerdys Antonio ReyesCuevas 55 y.o. male I MRN: [id number]  Unit/Bed#: [place] [address] -01 I Date of Admission: 7/16/2025   Date of Service: 7/18/2025 I Hospital Day: 2     Assessment & Plan  Cellulitis of right lower extremity  Has appearance of Streptococcal infection, with streaking erythema and inguinal pain.  Risk factors are diabetes and cracked skin between the toes of his right foot (may be tinea pedis).  He is feeling better than when this started.  Denies history of skin infections or diabetic wounds.  Patient is hemodynamically stable, with normal WBC count, but had 1x temp 102 on 7/17.  He had subjective fevers prior to admission.  CT just noting diffuse skin thickening without deeper or drainable infection.  There is reactive right inguinal LAD.    - Continue cefazolin 2 g every 8 hours; erythema improving  - Monitor BMP and CBCD to assess for developing medication toxicities and treatment response  - Follow up blood cultures from 7/16  - Serial leg exams  - Use topical antifungal for possible tinea pedis, which can be a risk factor for cellulitis  - Emollient creams to dry/cracked skin  - Anticipate quick transition to PO cefadroxil 1 g BID on 7/20, if afebrile and otherwise still improving  - Hope to treat x10 days total (end 7/25/25)  Type 2 diabetes mellitus without complication, without long-term current use of insulin ([facility])  Lab Results   Component Value Date    HGBA1C 8.0 (H) 07/17/2025     Recent Labs     07/17/25  1141 07/17/25  1605 07/17/25  2158 07/18/25  0713   POCGLU 215* 208* 164* 173*   This is a risk factor for infection.  - Maintain euglycemia to improve WBC function   - Management per primary team  Class 1 obesity with serious comorbidity and body mass index (BMI) of 31.0 to 31.9 in adult  This can impact antibiotic dosing.   - Use high dose cefazolin as above   Epigastric pain  Normal lipase.  Patient reports moderate alcohol use.  Consider  peptic ulcer.  - Management per primary team    I have discussed with Turner Clark the above plan to continue cefazolin. She agrees with the plan.    See tentative discharge plan above if patient improving.  ID service will reevaluate patient on 7/21 if he remains admitted.  Please reach out to on-call provider with any more urgent questions/concerns.    Antibiotics:  Antibiotic day #3  Cefazolin day #2    Subjective     Japanese interpretor ID [id number].    Patient felt sweaty with fever last night.  Otherwise feels he's improving with decreasing redness and pain in his leg.  Groin also less painful.  He's reporting some exacerbation of more chronic epigastric abdominal pain but no nausea, vomiting, diarrhea.      Objective :  Temp:  [99.4 °F (37.4 °C)-102 °F (38.9 °C)] 99.4 °F (37.4 °C)  HR:  [] 90  BP: (139-160)/(64-93) 139/84  Resp:  [18] 18  SpO2:  [95 %-96 %] 96 %  O2 Device: None (Room air)    General:  No acute distress  Psychiatric:  Awake and alert  Pulmonary:  Normal respiratory excursion without accessory muscle use  Abdomen:  Soft, mildly tender in epigastric region  Extremities:  No edema  Skin:  Improving erythema of RLE        Lab Results: I have reviewed the following results:  Results from last 7 days   Lab Units 07/18/25  0438 07/17/25 0445 07/16/25  1733   WBC Thousand/uL 8.82 9.50 9.37   HEMOGLOBIN g/dL 13.8 13.5 14.1   PLATELETS Thousands/uL 168 137* 142*     Results from last 7 days   Lab Units 07/18/25  0438 07/17/25  0445 07/16/25  1733   SODIUM mmol/L 137 134* 135   POTASSIUM mmol/L 3.5 3.8 3.9   CHLORIDE mmol/L 98 101 100   CO2 mmol/L 27 25 26   BUN mg/dL 11 16 26*   CREATININE mg/dL 0.65 0.69 1.42*   EGFR ml/min/1.73sq m 110 107 55   CALCIUM mg/dL 9.5 8.9 9.6   AST U/L  --   --  12*   ALT U/L  --   --  19   ALK PHOS U/L  --   --  60   ALBUMIN g/dL  --   --  4.1     Results from last 7 days   Lab Units 07/16/25  2235 07/16/25  1742 07/16/25  1733   BLOOD CULTURE   --  No Growth at 24  hrs. No Growth at 24 hrs.   MRSA CULTURE ONLY  No Methicillin Resistant Staphlyococcus aureus (MRSA) isolated  --   --      Results from last 7 days   Lab Units 07/16/25  1733   PROCALCITONIN ng/ml 0.40*                 Imaging Results Review: No pertinent imaging studies reviewed.  Other Study Results Review: No additional pertinent studies reviewed.    Parminder Augustine MD  [facility]

## 2025-07-18 NOTE — ASSESSMENT & PLAN NOTE
With intermittent mid epigastric abdominal sharp pain over the past 2 days  Patient has been having intermittent nonbloody emesis with improvement in abdominal discomfort  With 1 episode of emesis today, abdomen nontender on exam  No associated diarrhea, chest pain or radiation of pain  Patient reports rarely taking NSAIDs such as iburpofen or Advil  Does not drink daily, socially 1-2 beers on weekends  Trial Maalox, start IV PPI, Zofran prn  Will restart IV fluids, clear liquid diet  Check lipase, CT abdomen pelvis to rule out any gallbladder etiology, pancreatitis, gastritis

## 2025-07-18 NOTE — PLAN OF CARE
Problem: PAIN - ADULT  Goal: Verbalizes/displays adequate comfort level or baseline comfort level  Description: Interventions:  - Encourage patient to monitor pain and request assistance  - Assess pain using appropriate pain scale  - Administer analgesics as ordered based on type and severity of pain and evaluate response  - Implement non-pharmacological measures as appropriate and evaluate response  - Consider cultural and social influences on pain and pain management  - Notify physician/advanced practitioner if interventions unsuccessful or patient reports new pain  - Educate patient/family on pain management process including their role and importance of  reporting pain   - Provide non-pharmacologic/complimentary pain relief interventions  Outcome: Progressing     Problem: INFECTION - ADULT  Goal: Absence or prevention of progression during hospitalization  Description: INTERVENTIONS:  - Assess and monitor for signs and symptoms of infection  - Monitor lab/diagnostic results  - Monitor all insertion sites, i.e. indwelling lines, tubes, and drains  - Monitor endotracheal if appropriate and nasal secretions for changes in amount and color  - [place] appropriate cooling/warming therapies per order  - Administer medications as ordered  - Instruct and encourage patient and family to use good hand hygiene technique  - Identify and instruct in appropriate isolation precautions for identified infection/condition  Outcome: Progressing  Goal: Absence of fever/infection during neutropenic period  Description: INTERVENTIONS:  - Monitor WBC  - Perform strict hand hygiene  - Limit to healthy visitors only  - No plants, dried, fresh or silk flowers with luna in patient room  Outcome: Progressing     Problem: SAFETY ADULT  Goal: Maintain or return to baseline ADL function  Description: INTERVENTIONS:  -  Assess patient's ability to carry out ADLs; assess patient's baseline for ADL function and identify physical deficits  which impact ability to perform ADLs (bathing, care of mouth/teeth, toileting, grooming, dressing, etc.)  - Assess/evaluate cause of self-care deficits   - Assess range of motion  - Assess patient's mobility; develop plan if impaired  - Assess patient's need for assistive devices and provide as appropriate  - Encourage maximum independence but intervene and supervise when necessary  - Involve family in performance of ADLs  - Assess for home care needs following discharge   - Consider OT consult to assist with ADL evaluation and planning for discharge  - Provide patient education as appropriate  - Monitor functional capacity and physical performance, use of AM PAC & JH-HLM   - Monitor gait, balance and fatigue with ambulation    Outcome: Progressing     Problem: DISCHARGE PLANNING  Goal: Discharge to home or other facility with appropriate resources  Description: INTERVENTIONS:  - Identify barriers to discharge w/patient and caregiver  - Arrange for needed discharge resources and transportation as appropriate  - Identify discharge learning needs (meds, wound care, etc.)  - Arrange for interpretive services to assist at discharge as needed  - Refer to Case Management Department for coordinating discharge planning if the patient needs post-hospital services based on physician/advanced practitioner order or complex needs related to functional status, cognitive ability, or social support system  Outcome: Progressing     Problem: Knowledge Deficit  Goal: Patient/family/caregiver demonstrates understanding of disease process, treatment plan, medications, and discharge instructions  Description: Complete learning assessment and assess knowledge base.  Interventions:  - Provide teaching at level of understanding  - Provide teaching via preferred learning methods  Outcome: Progressing     Problem: MUSCULOSKELETAL - ADULT  Goal: Maintain or return mobility to safest level of function  Description: INTERVENTIONS:  - Assess  patient's ability to carry out ADLs; assess patient's baseline for ADL function and identify physical deficits which impact ability to perform ADLs (bathing, care of mouth/teeth, toileting, grooming, dressing, etc.)  - Assess/evaluate cause of self-care deficits   - Assess range of motion  - Assess patient's mobility  - Assess patient's need for assistive devices and provide as appropriate  - Encourage maximum independence but intervene and supervise when necessary  - Involve family in performance of ADLs  - Assess for home care needs following discharge   - Consider OT consult to assist with ADL evaluation and planning for discharge  - Provide patient education as appropriate  Outcome: Progressing

## 2025-07-18 NOTE — ASSESSMENT & PLAN NOTE
Lab Results   Component Value Date    HGBA1C 8.0 (H) 07/17/2025     Home medications include metformin  mg daily  Oral metformin on hold, continue sliding scale insulin with Accu-Cheks  No noted gap or uncontrolled hyperglycemia with infection at this time

## 2025-07-19 PROBLEM — E83.42 HYPOMAGNESEMIA: Status: ACTIVE | Noted: 2025-07-19

## 2025-07-19 PROBLEM — N17.9 AKI (ACUTE KIDNEY INJURY) (HCC): Status: RESOLVED | Noted: 2025-07-18 | Resolved: 2025-07-19

## 2025-07-19 PROBLEM — R59.9 ENLARGED LYMPH NODES: Status: ACTIVE | Noted: 2025-07-19

## 2025-07-19 LAB
ALBUMIN SERPL BCG-MCNC: 3.9 G/DL (ref 3.5–5)
ALP SERPL-CCNC: 57 U/L (ref 34–104)
ALT SERPL W P-5'-P-CCNC: 18 U/L (ref 7–52)
ANION GAP SERPL CALCULATED.3IONS-SCNC: 6 MMOL/L (ref 4–13)
AST SERPL W P-5'-P-CCNC: 17 U/L (ref 13–39)
BILIRUB SERPL-MCNC: 0.54 MG/DL (ref 0.2–1)
BUN SERPL-MCNC: 13 MG/DL (ref 5–25)
CALCIUM SERPL-MCNC: 9.3 MG/DL (ref 8.4–10.2)
CHLORIDE SERPL-SCNC: 101 MMOL/L (ref 96–108)
CO2 SERPL-SCNC: 31 MMOL/L (ref 21–32)
CREAT SERPL-MCNC: 0.84 MG/DL (ref 0.6–1.3)
ERYTHROCYTE [DISTWIDTH] IN BLOOD BY AUTOMATED COUNT: 12.3 % (ref 11.6–15.1)
GFR SERPL CREATININE-BSD FRML MDRD: 99 ML/MIN/1.73SQ M
GLUCOSE SERPL-MCNC: 154 MG/DL (ref 65–140)
GLUCOSE SERPL-MCNC: 163 MG/DL (ref 65–140)
GLUCOSE SERPL-MCNC: 169 MG/DL (ref 65–140)
GLUCOSE SERPL-MCNC: 171 MG/DL (ref 65–140)
GLUCOSE SERPL-MCNC: 228 MG/DL (ref 65–140)
HCT VFR BLD AUTO: 40.6 % (ref 36.5–49.3)
HGB BLD-MCNC: 13.5 G/DL (ref 12–17)
MAGNESIUM SERPL-MCNC: 1.8 MG/DL (ref 1.9–2.7)
MCH RBC QN AUTO: 27.8 PG (ref 26.8–34.3)
MCHC RBC AUTO-ENTMCNC: 33.3 G/DL (ref 31.4–37.4)
MCV RBC AUTO: 84 FL (ref 82–98)
PLATELET # BLD AUTO: 179 THOUSANDS/UL (ref 149–390)
PMV BLD AUTO: 9.8 FL (ref 8.9–12.7)
POTASSIUM SERPL-SCNC: 4.2 MMOL/L (ref 3.5–5.3)
PROT SERPL-MCNC: 7.3 G/DL (ref 6.4–8.4)
RBC # BLD AUTO: 4.85 MILLION/UL (ref 3.88–5.62)
SODIUM SERPL-SCNC: 138 MMOL/L (ref 135–147)
WBC # BLD AUTO: 7.76 THOUSAND/UL (ref 4.31–10.16)

## 2025-07-19 PROCEDURE — 85027 COMPLETE CBC AUTOMATED: CPT

## 2025-07-19 PROCEDURE — 99232 SBSQ HOSP IP/OBS MODERATE 35: CPT

## 2025-07-19 PROCEDURE — 82948 REAGENT STRIP/BLOOD GLUCOSE: CPT

## 2025-07-19 PROCEDURE — 80053 COMPREHEN METABOLIC PANEL: CPT

## 2025-07-19 PROCEDURE — 83735 ASSAY OF MAGNESIUM: CPT

## 2025-07-19 RX ORDER — MAGNESIUM SULFATE 1 G/100ML
1 INJECTION INTRAVENOUS ONCE
Status: COMPLETED | OUTPATIENT
Start: 2025-07-19 | End: 2025-07-19

## 2025-07-19 RX ORDER — SODIUM CHLORIDE 9 MG/ML
100 INJECTION, SOLUTION INTRAVENOUS CONTINUOUS
Status: DISCONTINUED | OUTPATIENT
Start: 2025-07-19 | End: 2025-07-19

## 2025-07-19 RX ADMIN — PANTOPRAZOLE SODIUM 40 MG: 40 INJECTION, POWDER, FOR SOLUTION INTRAVENOUS at 08:33

## 2025-07-19 RX ADMIN — INSULIN LISPRO 2 UNITS: 100 INJECTION, SOLUTION INTRAVENOUS; SUBCUTANEOUS at 11:52

## 2025-07-19 RX ADMIN — DOCUSATE SODIUM 100 MG: 100 CAPSULE, LIQUID FILLED ORAL at 17:02

## 2025-07-19 RX ADMIN — SODIUM CHLORIDE 100 ML/HR: 0.9 INJECTION, SOLUTION INTRAVENOUS at 03:38

## 2025-07-19 RX ADMIN — DOCUSATE SODIUM 100 MG: 100 CAPSULE, LIQUID FILLED ORAL at 08:33

## 2025-07-19 RX ADMIN — CLOTRIMAZOLE: 1 CREAM TOPICAL at 17:04

## 2025-07-19 RX ADMIN — INSULIN LISPRO 1 UNITS: 100 INJECTION, SOLUTION INTRAVENOUS; SUBCUTANEOUS at 21:32

## 2025-07-19 RX ADMIN — MAGNESIUM SULFATE HEPTAHYDRATE 1 G: 1 INJECTION, SOLUTION INTRAVENOUS at 08:34

## 2025-07-19 RX ADMIN — CLOTRIMAZOLE: 1 CREAM TOPICAL at 08:45

## 2025-07-19 RX ADMIN — CEFAZOLIN SODIUM 2000 MG: 2 SOLUTION INTRAVENOUS at 11:07

## 2025-07-19 RX ADMIN — INSULIN LISPRO 1 UNITS: 100 INJECTION, SOLUTION INTRAVENOUS; SUBCUTANEOUS at 08:33

## 2025-07-19 RX ADMIN — INSULIN LISPRO 1 UNITS: 100 INJECTION, SOLUTION INTRAVENOUS; SUBCUTANEOUS at 17:00

## 2025-07-19 RX ADMIN — CEFAZOLIN SODIUM 2000 MG: 2 SOLUTION INTRAVENOUS at 03:34

## 2025-07-19 RX ADMIN — PANTOPRAZOLE SODIUM 40 MG: 40 INJECTION, POWDER, FOR SOLUTION INTRAVENOUS at 21:32

## 2025-07-19 RX ADMIN — CEFAZOLIN SODIUM 2000 MG: 2 SOLUTION INTRAVENOUS at 19:32

## 2025-07-19 RX ADMIN — ATORVASTATIN CALCIUM 80 MG: 80 TABLET, FILM COATED ORAL at 17:02

## 2025-07-19 RX ADMIN — SODIUM CHLORIDE 100 ML/HR: 0.9 INJECTION, SOLUTION INTRAVENOUS at 08:39

## 2025-07-19 NOTE — PLAN OF CARE
Problem: PAIN - ADULT  Goal: Verbalizes/displays adequate comfort level or baseline comfort level  Description: Interventions:  - Encourage patient to monitor pain and request assistance  - Assess pain using appropriate pain scale  - Administer analgesics as ordered based on type and severity of pain and evaluate response  - Implement non-pharmacological measures as appropriate and evaluate response  - Consider cultural and social influences on pain and pain management  - Notify physician/advanced practitioner if interventions unsuccessful or patient reports new pain  - Educate patient/family on pain management process including their role and importance of  reporting pain   - Provide non-pharmacologic/complimentary pain relief interventions  Outcome: Progressing     Problem: INFECTION - ADULT  Goal: Absence or prevention of progression during hospitalization  Description: INTERVENTIONS:  - Assess and monitor for signs and symptoms of infection  - Monitor lab/diagnostic results  - Monitor all insertion sites, i.e. indwelling lines, tubes, and drains  - Monitor endotracheal if appropriate and nasal secretions for changes in amount and color  - [place] appropriate cooling/warming therapies per order  - Administer medications as ordered  - Instruct and encourage patient and family to use good hand hygiene technique  - Identify and instruct in appropriate isolation precautions for identified infection/condition  Outcome: Progressing     Problem: SAFETY ADULT  Goal: Maintain or return to baseline ADL function  Description: INTERVENTIONS:  -  Assess patient's ability to carry out ADLs; assess patient's baseline for ADL function and identify physical deficits which impact ability to perform ADLs (bathing, care of mouth/teeth, toileting, grooming, dressing, etc.)  - Assess/evaluate cause of self-care deficits   - Assess range of motion  - Assess patient's mobility; develop plan if impaired  - Assess patient's need for  assistive devices and provide as appropriate  - Encourage maximum independence but intervene and supervise when necessary  - Involve family in performance of ADLs  - Assess for home care needs following discharge   - Consider OT consult to assist with ADL evaluation and planning for discharge  - Provide patient education as appropriate  - Monitor functional capacity and physical performance, use of AM PAC & JH-HLM   - Monitor gait, balance and fatigue with ambulation    Outcome: Progressing     Problem: DISCHARGE PLANNING  Goal: Discharge to home or other facility with appropriate resources  Description: INTERVENTIONS:  - Identify barriers to discharge w/patient and caregiver  - Arrange for needed discharge resources and transportation as appropriate  - Identify discharge learning needs (meds, wound care, etc.)  - Arrange for interpretive services to assist at discharge as needed  - Refer to Case Management Department for coordinating discharge planning if the patient needs post-hospital services based on physician/advanced practitioner order or complex needs related to functional status, cognitive ability, or social support system  Outcome: Progressing     Problem: Knowledge Deficit  Goal: Patient/family/caregiver demonstrates understanding of disease process, treatment plan, medications, and discharge instructions  Description: Complete learning assessment and assess knowledge base.  Interventions:  - Provide teaching at level of understanding  - Provide teaching via preferred learning methods  Outcome: Progressing     Problem: MUSCULOSKELETAL - ADULT  Goal: Maintain or return mobility to safest level of function  Description: INTERVENTIONS:  - Assess patient's ability to carry out ADLs; assess patient's baseline for ADL function and identify physical deficits which impact ability to perform ADLs (bathing, care of mouth/teeth, toileting, grooming, dressing, etc.)  - Assess/evaluate cause of self-care deficits   -  Assess range of motion  - Assess patient's mobility  - Assess patient's need for assistive devices and provide as appropriate  - Encourage maximum independence but intervene and supervise when necessary  - Involve family in performance of ADLs  - Assess for home care needs following discharge   - Consider OT consult to assist with ADL evaluation and planning for discharge  - Provide patient education as appropriate  Outcome: Progressing

## 2025-07-19 NOTE — PLAN OF CARE
Problem: PAIN - ADULT  Goal: Verbalizes/displays adequate comfort level or baseline comfort level  Description: Interventions:  - Encourage patient to monitor pain and request assistance  - Assess pain using appropriate pain scale  - Administer analgesics as ordered based on type and severity of pain and evaluate response  - Implement non-pharmacological measures as appropriate and evaluate response  - Consider cultural and social influences on pain and pain management  - Notify physician/advanced practitioner if interventions unsuccessful or patient reports new pain  - Educate patient/family on pain management process including their role and importance of  reporting pain   - Provide non-pharmacologic/complimentary pain relief interventions  Outcome: Progressing     Problem: INFECTION - ADULT  Goal: Absence or prevention of progression during hospitalization  Description: INTERVENTIONS:  - Assess and monitor for signs and symptoms of infection  - Monitor lab/diagnostic results  - Monitor all insertion sites, i.e. indwelling lines, tubes, and drains  - Monitor endotracheal if appropriate and nasal secretions for changes in amount and color  - [place] appropriate cooling/warming therapies per order  - Administer medications as ordered  - Instruct and encourage patient and family to use good hand hygiene technique  - Identify and instruct in appropriate isolation precautions for identified infection/condition  Outcome: Progressing     Problem: SAFETY ADULT  Goal: Maintain or return to baseline ADL function  Description: INTERVENTIONS:  -  Assess patient's ability to carry out ADLs; assess patient's baseline for ADL function and identify physical deficits which impact ability to perform ADLs (bathing, care of mouth/teeth, toileting, grooming, dressing, etc.)  - Assess/evaluate cause of self-care deficits   - Assess range of motion  - Assess patient's mobility; develop plan if impaired  - Assess patient's need for  assistive devices and provide as appropriate  - Encourage maximum independence but intervene and supervise when necessary  - Involve family in performance of ADLs  - Assess for home care needs following discharge   - Consider OT consult to assist with ADL evaluation and planning for discharge  - Provide patient education as appropriate  - Monitor functional capacity and physical performance, use of AM PAC & JH-HLM   - Monitor gait, balance and fatigue with ambulation    Outcome: Progressing     Problem: DISCHARGE PLANNING  Goal: Discharge to home or other facility with appropriate resources  Description: INTERVENTIONS:  - Identify barriers to discharge w/patient and caregiver  - Arrange for needed discharge resources and transportation as appropriate  - Identify discharge learning needs (meds, wound care, etc.)  - Arrange for interpretive services to assist at discharge as needed  - Refer to Case Management Department for coordinating discharge planning if the patient needs post-hospital services based on physician/advanced practitioner order or complex needs related to functional status, cognitive ability, or social support system  Outcome: Progressing     Problem: Knowledge Deficit  Goal: Patient/family/caregiver demonstrates understanding of disease process, treatment plan, medications, and discharge instructions  Description: Complete learning assessment and assess knowledge base.  Interventions:  - Provide teaching at level of understanding  - Provide teaching via preferred learning methods  Outcome: Progressing     Problem: MUSCULOSKELETAL - ADULT  Goal: Maintain or return mobility to safest level of function  Description: INTERVENTIONS:  - Assess patient's ability to carry out ADLs; assess patient's baseline for ADL function and identify physical deficits which impact ability to perform ADLs (bathing, care of mouth/teeth, toileting, grooming, dressing, etc.)  - Assess/evaluate cause of self-care deficits   -  Assess range of motion  - Assess patient's mobility  - Assess patient's need for assistive devices and provide as appropriate  - Encourage maximum independence but intervene and supervise when necessary  - Involve family in performance of ADLs  - Assess for home care needs following discharge   - Consider OT consult to assist with ADL evaluation and planning for discharge  - Provide patient education as appropriate  Outcome: Progressing

## 2025-07-19 NOTE — PROGRESS NOTES
Progress Note - Hospitalist   Name: Gerdys Antonio ReyesCuevas 55 y.o. male I MRN: [id number]  Unit/Bed#: [place] [address] -01 I Date of Admission: 7/16/2025   Date of Service: 7/19/2025 I Hospital Day: 3    Assessment & Plan  Cellulitis of right lower extremity  Presenting with worsening RLE pain, fevers and chills..  Started with right inguinal pain, then progressive erythema.  No trauma, history of skin infection or diabetic wounds.  CT of the lower leg with circumferential subcutaneous edema and skin thickening, no soft tissue gas.  Right inguinal adenopathy which is felt to be reactive  Venous duplex negative  Appreciate infectious disease evaluation  Suspected with streptococcal infection due to streaking erythema and right inguinal pain  Continue IV cefazolin with hopes to transition or oral cefadroxil tomorrow, 7/20 if patient continues to improve remains afebrile  Blood cultures negative at 48 hours  Continue to monitor fever curve  Continue serial leg monitoring - inguinal streaking and LE erythema improving  Continue topical antifungal and emollients to dry cracked skin on lower extremities which could be risk factor to cellulitis  Type 2 diabetes mellitus without complication, without long-term current use of insulin ([facility])  Lab Results   Component Value Date    HGBA1C 8.0 (H) 07/17/2025     Home medications include metformin  mg daily  Oral metformin on hold, continue sliding scale insulin with Accu-Cheks  No noted gap or uncontrolled hyperglycemia with infection at this time  LISA (acute kidney injury) ([facility]) (Resolved: 7/19/2025)  Creatinine elevated 1.42 on admission, originally suspected due to LISA and possible CKD with longstanding diabetes although patient underwent IV fluids and LISA has now resolved, GFR above 100  Suspect prerenal cause in setting of intermittent emesis and poor oral intake  UA reviewed, grossly unremarkable  Creatinine remains stable, emesis has improved, patient is voiding.  DC fluids  Epigastric pain  With intermittent midepigastric abdominal sharp pain over the past 2 days and associated nonbloody emesis with improved abdominal discomfort afterwards  CT abdomen pelvis with contrast imaging with nonspecific enlarged right external iliac nodes, suspected to be reactive.  No other acute findings, lipase normal  Suspect's symptoms reactive from gastroenteritis/GERD vs infection, patient had improvement with Maalox  Patient is free from abdominal pain and now tolerating diet  No associated diarrhea, chest pain or radiation of pain  Patient reports rarely taking NSAIDs such as iburpofen or Advil  Does not drink daily, socially 1-2 beers on weekends  Continue IV PPI and Zofran as needed for now  Will discontinue IV fluids, advance diet as tolerated.  Class 1 obesity with serious comorbidity and body mass index (BMI) of 31.0 to 31.9 in adult  Noted contributes all aspects of care  Enlarged lymph nodes  Noted on CT lower extremity and abdominal imaging  Discussed with patient metastasis cannot be ruled out although this is more likely felt to be reactive in setting of streptococcal cellulitis  Patient denies any recent unsolicited weight loss or poor appetite  Did encourage him for outpatient PCP follow-up so he can get reimaged and ensure he has all age-appropriate malignancy screenings  Hypomagnesemia  In setting of recent GI loss which is improving, replete IV    VTE Pharmacologic Prophylaxis: VTE Score: 2 Low Risk (Score 0-2) - Encourage Ambulation.    Mobility:   Basic Mobility Inpatient Raw Score: 24  JH-HLM Goal: 8: Walk 250 feet or more  JH-HLM Achieved: 7: Walk 25 feet or more  JH-HLM Goal achieved. Continue to encourage appropriate mobility.    Patient Centered Rounds: I performed bedside rounds with nursing staff today.     Education and Discussions with Family / Patient: Patient    Current Length of Stay: 3 day(s)  Current Patient Status: Inpatient   Certification Statement: The  patient will continue to require additional inpatient hospital stay due to IV antibiotics, monitoring intake and output  Discharge Plan: Anticipate discharge in 24-48 hrs to home.    Code Status: Level 1 - Full Code    Subjective   Patient seen and examined lying in bed.  States he has had no recurrent vomiting today and is tolerating a clear liquid diet.  Per reports his wife has been bringing in food and he is eating it without difficulty.  He denies any current nausea, abdominal pain, diarrhea.  He has not had a fever overnight.  States he has been urinating more frequently with fluids.  Denies any current chest pain shortness of breath.  Feels a little pain in his right lower leg when he goes to stand but states overall it is getting better and the swelling is improving.  Patient's right groin pain and swelling has gone down.    Objective :  Temp:  [97.9 °F (36.6 °C)-99.5 °F (37.5 °C)] 98.7 °F (37.1 °C)  HR:  [87-88] 87  BP: (132-146)/(69-83) 132/69  Resp:  [18] 18  SpO2:  [97 %-98 %] 98 %  O2 Device: None (Room air)    Body mass index is 31.63 kg/m².     Input and Output Summary (last 24 hours):     Intake/Output Summary (Last 24 hours) at 7/19/2025 1014  Last data filed at 7/19/2025 0929  Gross per 24 hour   Intake 360 ml   Output 600 ml   Net -240 ml       Physical Exam  Vitals and nursing note reviewed.   Constitutional:       Appearance: Normal appearance. He is obese. He is not ill-appearing or diaphoretic.     Cardiovascular:      Rate and Rhythm: Normal rate and regular rhythm.   Pulmonary:      Effort: Pulmonary effort is normal. No respiratory distress.      Breath sounds: Normal breath sounds.   Abdominal:      General: Bowel sounds are normal.      Palpations: Abdomen is soft.      Tenderness: There is no abdominal tenderness. There is no guarding or rebound.     Musculoskeletal:         General: Swelling and tenderness present.      Comments: DP pulse +2,normal ROM of RLE     Skin:     General: Skin  is warm and dry.      Coloration: Skin is not jaundiced.      Findings: Erythema present.      Comments: RLE lower extremity erythema descending from marked lines.  No raised vesicular lesion.  Right groin lymphadenopathy swelling improved, no noted erythema or streaking.     Neurological:      Mental Status: He is alert and oriented to person, place, and time. Mental status is at baseline.     Psychiatric:         Mood and Affect: Mood normal.         Behavior: Behavior normal.         Lab Results: I have reviewed the following results:   Results from last 7 days   Lab Units 07/19/25  0503 07/17/25  0445 07/16/25  1733   WBC Thousand/uL 7.76   < > 9.37   HEMOGLOBIN g/dL 13.5   < > 14.1   HEMATOCRIT % 40.6   < > 41.7   PLATELETS Thousands/uL 179   < > 142*   SEGS PCT %  --   --  81*   LYMPHO PCT %  --   --  13*   MONO PCT %  --   --  5   EOS PCT %  --   --  1    < > = values in this interval not displayed.     Results from last 7 days   Lab Units 07/19/25  0503   SODIUM mmol/L 138   POTASSIUM mmol/L 4.2   CHLORIDE mmol/L 101   CO2 mmol/L 31   BUN mg/dL 13   CREATININE mg/dL 0.84   ANION GAP mmol/L 6   CALCIUM mg/dL 9.3   ALBUMIN g/dL 3.9   TOTAL BILIRUBIN mg/dL 0.54   ALK PHOS U/L 57   ALT U/L 18   AST U/L 17   GLUCOSE RANDOM mg/dL 154*     Results from last 7 days   Lab Units 07/16/25  1733   INR  1.19     Results from last 7 days   Lab Units 07/19/25  0717 07/18/25  2111 07/18/25  1621 07/18/25  1124 07/18/25  0713 07/17/25  2158 07/17/25  1605 07/17/25  1141 07/16/25  2131   POC GLUCOSE mg/dl 171* 182* 177* 166* 173* 164* 208* 215* 123     Results from last 7 days   Lab Units 07/17/25  0445   HEMOGLOBIN A1C % 8.0*     Results from last 7 days   Lab Units 07/16/25  1733   LACTIC ACID mmol/L 1.4   PROCALCITONIN ng/ml 0.40*     Recent Cultures (last 7 days):   Results from last 7 days   Lab Units 07/16/25  1742 07/16/25  1733   BLOOD CULTURE  No Growth at 48 hrs. No Growth at 48 hrs.     Last 24 Hours Medication  List:     Current Facility-Administered Medications:     acetaminophen (TYLENOL) tablet 650 mg, Q4H PRN    aluminum-magnesium hydroxide-simethicone (MAALOX) oral suspension 30 mL, Q4H PRN    atorvastatin (LIPITOR) tablet 80 mg, Daily With Dinner    ceFAZolin (ANCEF) IVPB (premix in dextrose) 2,000 mg 50 mL, Q8H, Last Rate: 2,000 mg (07/19/25 0334)    clotrimazole (LOTRIMIN) 1 % cream, BID    docusate sodium (COLACE) capsule 100 mg, BID    insulin lispro (HumALOG/ADMELOG) 100 units/mL subcutaneous injection 1-5 Units, 4x Daily (AC & HS) **AND** Fingerstick Glucose (POCT), 4x Daily AC and at bedtime    ondansetron (ZOFRAN) injection 4 mg, Q6H PRN    pantoprazole (PROTONIX) injection 40 mg, Q12H MINE    Administrative Statements   Today, Patient Was Seen By: Turner Clark PA-C    **Please Note: This note may have been constructed using a voice recognition system.**

## 2025-07-19 NOTE — ASSESSMENT & PLAN NOTE
Creatinine elevated 1.42 on admission, originally suspected due to LISA and possible CKD with longstanding diabetes although patient underwent IV fluids and LISA has now resolved, GFR above 100  Suspect prerenal cause in setting of intermittent emesis and poor oral intake  UA reviewed, grossly unremarkable  Creatinine remains stable, emesis has improved, patient is voiding. DC fluids

## 2025-07-19 NOTE — ASSESSMENT & PLAN NOTE
With intermittent midepigastric abdominal sharp pain over the past 2 days and associated nonbloody emesis with improved abdominal discomfort afterwards  CT abdomen pelvis with contrast imaging with nonspecific enlarged right external iliac nodes, suspected to be reactive.  No other acute findings, lipase normal  Suspect's symptoms reactive from gastroenteritis/GERD vs infection, patient had improvement with Maalox  Patient is free from abdominal pain and now tolerating diet  No associated diarrhea, chest pain or radiation of pain  Patient reports rarely taking NSAIDs such as iburpofen or Advil  Does not drink daily, socially 1-2 beers on weekends  Continue IV PPI and Zofran as needed for now  Will discontinue IV fluids, advance diet as tolerated.

## 2025-07-19 NOTE — ASSESSMENT & PLAN NOTE
Noted on CT lower extremity and abdominal imaging  Discussed with patient metastasis cannot be ruled out although this is more likely felt to be reactive in setting of streptococcal cellulitis  Patient denies any recent unsolicited weight loss or poor appetite  Did encourage him for outpatient PCP follow-up so he can get reimaged and ensure he has all age-appropriate malignancy screenings

## 2025-07-20 VITALS
HEART RATE: 80 BPM | DIASTOLIC BLOOD PRESSURE: 82 MMHG | BODY MASS INDEX: 31.56 KG/M2 | WEIGHT: 220.46 LBS | SYSTOLIC BLOOD PRESSURE: 130 MMHG | HEIGHT: 70 IN | TEMPERATURE: 98.5 F | OXYGEN SATURATION: 95 % | RESPIRATION RATE: 14 BRPM

## 2025-07-20 PROBLEM — B35.3 TINEA PEDIS: Status: ACTIVE | Noted: 2025-07-20

## 2025-07-20 LAB
ANION GAP SERPL CALCULATED.3IONS-SCNC: 6 MMOL/L (ref 4–13)
BUN SERPL-MCNC: 14 MG/DL (ref 5–25)
CALCIUM SERPL-MCNC: 9.4 MG/DL (ref 8.4–10.2)
CHLORIDE SERPL-SCNC: 100 MMOL/L (ref 96–108)
CO2 SERPL-SCNC: 32 MMOL/L (ref 21–32)
CREAT SERPL-MCNC: 0.73 MG/DL (ref 0.6–1.3)
ERYTHROCYTE [DISTWIDTH] IN BLOOD BY AUTOMATED COUNT: 12 % (ref 11.6–15.1)
GFR SERPL CREATININE-BSD FRML MDRD: 105 ML/MIN/1.73SQ M
GLUCOSE SERPL-MCNC: 169 MG/DL (ref 65–140)
GLUCOSE SERPL-MCNC: 179 MG/DL (ref 65–140)
HCT VFR BLD AUTO: 39.8 % (ref 36.5–49.3)
HGB BLD-MCNC: 13.3 G/DL (ref 12–17)
MAGNESIUM SERPL-MCNC: 1.9 MG/DL (ref 1.9–2.7)
MCH RBC QN AUTO: 27 PG (ref 26.8–34.3)
MCHC RBC AUTO-ENTMCNC: 33.4 G/DL (ref 31.4–37.4)
MCV RBC AUTO: 81 FL (ref 82–98)
PLATELET # BLD AUTO: 199 THOUSANDS/UL (ref 149–390)
PMV BLD AUTO: 9.4 FL (ref 8.9–12.7)
POTASSIUM SERPL-SCNC: 3.9 MMOL/L (ref 3.5–5.3)
RBC # BLD AUTO: 4.93 MILLION/UL (ref 3.88–5.62)
SODIUM SERPL-SCNC: 138 MMOL/L (ref 135–147)
WBC # BLD AUTO: 7.32 THOUSAND/UL (ref 4.31–10.16)

## 2025-07-20 PROCEDURE — 80048 BASIC METABOLIC PNL TOTAL CA: CPT

## 2025-07-20 PROCEDURE — 83735 ASSAY OF MAGNESIUM: CPT

## 2025-07-20 PROCEDURE — 99239 HOSP IP/OBS DSCHRG MGMT >30: CPT

## 2025-07-20 PROCEDURE — 85027 COMPLETE CBC AUTOMATED: CPT

## 2025-07-20 PROCEDURE — 82948 REAGENT STRIP/BLOOD GLUCOSE: CPT

## 2025-07-20 RX ORDER — PANTOPRAZOLE SODIUM 40 MG/1
40 TABLET, DELAYED RELEASE ORAL DAILY
Qty: 30 TABLET | Refills: 0 | Status: SHIPPED | OUTPATIENT
Start: 2025-07-20 | End: 2025-07-20

## 2025-07-20 RX ORDER — CLOTRIMAZOLE 1 %
CREAM (GRAM) TOPICAL 2 TIMES DAILY
Qty: 42 G | Refills: 0 | Status: SHIPPED | OUTPATIENT
Start: 2025-07-20 | End: 2025-07-20

## 2025-07-20 RX ORDER — CLOTRIMAZOLE 1 %
CREAM (GRAM) TOPICAL 2 TIMES DAILY
Qty: 42 G | Refills: 0 | Status: SHIPPED | OUTPATIENT
Start: 2025-07-20 | End: 2025-08-17

## 2025-07-20 RX ORDER — CEFADROXIL 1000 MG/1
1 TABLET ORAL EVERY 12 HOURS SCHEDULED
Qty: 10 TABLET | Refills: 0 | Status: SHIPPED | OUTPATIENT
Start: 2025-07-20 | End: 2025-07-25

## 2025-07-20 RX ORDER — CEFADROXIL 1000 MG/1
1 TABLET ORAL EVERY 12 HOURS SCHEDULED
Qty: 10 TABLET | Refills: 0 | Status: SHIPPED | OUTPATIENT
Start: 2025-07-20 | End: 2025-07-20

## 2025-07-20 RX ORDER — PANTOPRAZOLE SODIUM 40 MG/1
40 TABLET, DELAYED RELEASE ORAL DAILY
Qty: 30 TABLET | Refills: 0 | Status: SHIPPED | OUTPATIENT
Start: 2025-07-20

## 2025-07-20 RX ADMIN — INSULIN LISPRO 1 UNITS: 100 INJECTION, SOLUTION INTRAVENOUS; SUBCUTANEOUS at 09:01

## 2025-07-20 RX ADMIN — CEFAZOLIN SODIUM 2000 MG: 2 SOLUTION INTRAVENOUS at 11:18

## 2025-07-20 RX ADMIN — PANTOPRAZOLE SODIUM 40 MG: 40 INJECTION, POWDER, FOR SOLUTION INTRAVENOUS at 09:00

## 2025-07-20 RX ADMIN — CEFAZOLIN SODIUM 2000 MG: 2 SOLUTION INTRAVENOUS at 03:18

## 2025-07-20 RX ADMIN — DOCUSATE SODIUM 100 MG: 100 CAPSULE, LIQUID FILLED ORAL at 09:00

## 2025-07-20 RX ADMIN — CLOTRIMAZOLE: 1 CREAM TOPICAL at 09:01

## 2025-07-20 NOTE — ASSESSMENT & PLAN NOTE
Noted on bilateral feet, continue clotrimazole cream twice daily for 4 weeks  OP PCP follow up  Discussed treatment with patient and that this is a risk factor for cellulitis.

## 2025-07-20 NOTE — ASSESSMENT & PLAN NOTE
Lab Results   Component Value Date    HGBA1C 8.0 (H) 07/17/2025     No noted gap or uncontrolled hyperglycemia with infection at this time  Resume metformin and monitor sugars.  Follow-up PCP to ensure adequate blood sugar control to reduce ongoing risk of infections.  Discussed with patient

## 2025-07-20 NOTE — PLAN OF CARE
Problem: PAIN - ADULT  Goal: Verbalizes/displays adequate comfort level or baseline comfort level  Description: Interventions:  - Encourage patient to monitor pain and request assistance  - Assess pain using appropriate pain scale  - Administer analgesics as ordered based on type and severity of pain and evaluate response  - Implement non-pharmacological measures as appropriate and evaluate response  - Consider cultural and social influences on pain and pain management  - Notify physician/advanced practitioner if interventions unsuccessful or patient reports new pain  - Educate patient/family on pain management process including their role and importance of  reporting pain   - Provide non-pharmacologic/complimentary pain relief interventions  Outcome: Progressing     Problem: INFECTION - ADULT  Goal: Absence or prevention of progression during hospitalization  Description: INTERVENTIONS:  - Assess and monitor for signs and symptoms of infection  - Monitor lab/diagnostic results  - Monitor all insertion sites, i.e. indwelling lines, tubes, and drains  - Monitor endotracheal if appropriate and nasal secretions for changes in amount and color  - [place] appropriate cooling/warming therapies per order  - Administer medications as ordered  - Instruct and encourage patient and family to use good hand hygiene technique  - Identify and instruct in appropriate isolation precautions for identified infection/condition  Outcome: Progressing     Problem: SAFETY ADULT  Goal: Maintain or return to baseline ADL function  Description: INTERVENTIONS:  -  Assess patient's ability to carry out ADLs; assess patient's baseline for ADL function and identify physical deficits which impact ability to perform ADLs (bathing, care of mouth/teeth, toileting, grooming, dressing, etc.)  - Assess/evaluate cause of self-care deficits   - Assess range of motion  - Assess patient's mobility; develop plan if impaired  - Assess patient's need for  assistive devices and provide as appropriate  - Encourage maximum independence but intervene and supervise when necessary  - Involve family in performance of ADLs  - Assess for home care needs following discharge   - Consider OT consult to assist with ADL evaluation and planning for discharge  - Provide patient education as appropriate  - Monitor functional capacity and physical performance, use of AM PAC & JH-HLM   - Monitor gait, balance and fatigue with ambulation    Outcome: Progressing     Problem: DISCHARGE PLANNING  Goal: Discharge to home or other facility with appropriate resources  Description: INTERVENTIONS:  - Identify barriers to discharge w/patient and caregiver  - Arrange for needed discharge resources and transportation as appropriate  - Identify discharge learning needs (meds, wound care, etc.)  - Arrange for interpretive services to assist at discharge as needed  - Refer to Case Management Department for coordinating discharge planning if the patient needs post-hospital services based on physician/advanced practitioner order or complex needs related to functional status, cognitive ability, or social support system  Outcome: Progressing     Problem: Knowledge Deficit  Goal: Patient/family/caregiver demonstrates understanding of disease process, treatment plan, medications, and discharge instructions  Description: Complete learning assessment and assess knowledge base.  Interventions:  - Provide teaching at level of understanding  - Provide teaching via preferred learning methods  Outcome: Progressing     Problem: MUSCULOSKELETAL - ADULT  Goal: Maintain or return mobility to safest level of function  Description: INTERVENTIONS:  - Assess patient's ability to carry out ADLs; assess patient's baseline for ADL function and identify physical deficits which impact ability to perform ADLs (bathing, care of mouth/teeth, toileting, grooming, dressing, etc.)  - Assess/evaluate cause of self-care deficits   -  Assess range of motion  - Assess patient's mobility  - Assess patient's need for assistive devices and provide as appropriate  - Encourage maximum independence but intervene and supervise when necessary  - Involve family in performance of ADLs  - Assess for home care needs following discharge   - Consider OT consult to assist with ADL evaluation and planning for discharge  - Provide patient education as appropriate  Outcome: Progressing

## 2025-07-20 NOTE — NURSING NOTE
Reviewed discharge instructions and upcoming medications. Discussed the importance of follow-up care.

## 2025-07-20 NOTE — ASSESSMENT & PLAN NOTE
Noted on CT lower extremity and abdominal imaging  Discussed with patient bedside metastasis cannot be ruled out although this is more likely felt to be reactive in setting of streptococcal cellulitis  Patient denies any recent unsolicited weight loss or poor appetite  Did encourage him for outpatient PCP follow-up so he can get reimaged and ensure he has all age-appropriate malignancy screenings.  Also placed his information on his discharge paperwork   [Negative] : Heme/Lymph [Abdominal Pain] : no abdominal pain [Constipation] : constipation

## 2025-07-20 NOTE — ASSESSMENT & PLAN NOTE
This has improved with supportive care, IV fluids, antibiotics, PPI therapy  Suspected to be in setting of infection versus gastroenteritis/GERD  No difficulties swallowing, tolerating diet, abdominal pain, associated diarrhea, dark bloody stools  Discussed with patient to be cautious when using NSAIDs at home.  Continue PPI discharge and follow-up outpatient with PCP

## 2025-07-20 NOTE — ASSESSMENT & PLAN NOTE
Noted on bilateral feet, continue clotrimazole cream twice daily for 4 weeks  OP PCP follow up  Discussed treatment with patient and that this is a risk factor for cellulitis

## 2025-07-20 NOTE — INCIDENTAL FINDINGS
The following findings require follow up:  Radiographic finding    Nonspecific enlarged right external iliac nodes and incompletely visualized prominent right inguinal nodes. These are nonspecific and may just be reactive. Possibility of metastases is not excluded at this time. Correlate clinically. Recommend 3-month CT follow-up.      Please notify the following clinician to assist with the follow up:  PCP in [place]    Incidental finding results were discussed with the Patient by Turner Clark PA-C on 07/20/25.   They expressed understanding and all questions answered.

## 2025-07-20 NOTE — PLAN OF CARE
Problem: PAIN - ADULT  Goal: Verbalizes/displays adequate comfort level or baseline comfort level  Description: Interventions:  - Encourage patient to monitor pain and request assistance  - Assess pain using appropriate pain scale  - Administer analgesics as ordered based on type and severity of pain and evaluate response  - Implement non-pharmacological measures as appropriate and evaluate response  - Consider cultural and social influences on pain and pain management  - Notify physician/advanced practitioner if interventions unsuccessful or patient reports new pain  - Educate patient/family on pain management process including their role and importance of  reporting pain   - Provide non-pharmacologic/complimentary pain relief interventions  Outcome: Progressing     Problem: INFECTION - ADULT  Goal: Absence or prevention of progression during hospitalization  Description: INTERVENTIONS:  - Assess and monitor for signs and symptoms of infection  - Monitor lab/diagnostic results  - Monitor all insertion sites, i.e. indwelling lines, tubes, and drains  - Monitor endotracheal if appropriate and nasal secretions for changes in amount and color  - [place] appropriate cooling/warming therapies per order  - Administer medications as ordered  - Instruct and encourage patient and family to use good hand hygiene technique  - Identify and instruct in appropriate isolation precautions for identified infection/condition  Outcome: Progressing     Problem: SAFETY ADULT  Goal: Maintain or return to baseline ADL function  Description: INTERVENTIONS:  -  Assess patient's ability to carry out ADLs; assess patient's baseline for ADL function and identify physical deficits which impact ability to perform ADLs (bathing, care of mouth/teeth, toileting, grooming, dressing, etc.)  - Assess/evaluate cause of self-care deficits   - Assess range of motion  - Assess patient's mobility; develop plan if impaired  - Assess patient's need for  assistive devices and provide as appropriate  - Encourage maximum independence but intervene and supervise when necessary  - Involve family in performance of ADLs  - Assess for home care needs following discharge   - Consider OT consult to assist with ADL evaluation and planning for discharge  - Provide patient education as appropriate  - Monitor functional capacity and physical performance, use of AM PAC & JH-HLM   - Monitor gait, balance and fatigue with ambulation    Outcome: Progressing     Problem: DISCHARGE PLANNING  Goal: Discharge to home or other facility with appropriate resources  Description: INTERVENTIONS:  - Identify barriers to discharge w/patient and caregiver  - Arrange for needed discharge resources and transportation as appropriate  - Identify discharge learning needs (meds, wound care, etc.)  - Arrange for interpretive services to assist at discharge as needed  - Refer to Case Management Department for coordinating discharge planning if the patient needs post-hospital services based on physician/advanced practitioner order or complex needs related to functional status, cognitive ability, or social support system  Outcome: Progressing     Problem: Knowledge Deficit  Goal: Patient/family/caregiver demonstrates understanding of disease process, treatment plan, medications, and discharge instructions  Description: Complete learning assessment and assess knowledge base.  Interventions:  - Provide teaching at level of understanding  - Provide teaching via preferred learning methods  Outcome: Progressing     Problem: MUSCULOSKELETAL - ADULT  Goal: Maintain or return mobility to safest level of function  Description: INTERVENTIONS:  - Assess patient's ability to carry out ADLs; assess patient's baseline for ADL function and identify physical deficits which impact ability to perform ADLs (bathing, care of mouth/teeth, toileting, grooming, dressing, etc.)  - Assess/evaluate cause of self-care deficits   -  Assess range of motion  - Assess patient's mobility  - Assess patient's need for assistive devices and provide as appropriate  - Encourage maximum independence but intervene and supervise when necessary  - Involve family in performance of ADLs  - Assess for home care needs following discharge   - Consider OT consult to assist with ADL evaluation and planning for discharge  - Provide patient education as appropriate  Outcome: Progressing

## 2025-07-20 NOTE — ASSESSMENT & PLAN NOTE
Noted on CT lower extremity and abdominal imaging  Discussed with patient bedside metastasis cannot be ruled out although this is more likely felt to be reactive in setting of streptococcal cellulitis  Patient denies any recent unsolicited weight loss or poor appetite  Did encourage him for outpatient PCP follow-up so he can get reimaged and ensure he has all age-appropriate malignancy screenings.  Also placed his information on his discharge paperwork

## 2025-07-20 NOTE — ASSESSMENT & PLAN NOTE
Presenting with worsening RLE pain, fevers and chills..  Started with right inguinal pain, then progressive erythema.  No trauma, history of skin infection or diabetic wounds.  CT of the lower leg with circumferential subcutaneous edema and skin thickening, no soft tissue gas.  Right inguinal adenopathy which is felt to be reactive  Venous duplex negative  Appreciate infectious disease evaluation  Suspected with streptococcal infection due to streaking erythema and right inguinal pain -significantly has improved.  Remains afebrile over 24 hours without bacteremia at 72 hours  Transition off IV cefazolin over to oral cefadroxil 1 g twice daily through 7/25/2025  Discussed red flag symptoms and completion of antibiotic therapy with patient bedside

## 2025-07-20 NOTE — DISCHARGE INSTR - AVS FIRST PAGE
See your family doctor in 1 week    You had enlarged lymph notes on CT scan of your stomach and pelvis. You need another CT scan in 2 to 3 months with your family doctor to make sure these are gone after your infection.     Your antibiotic was sent to [facility]. Complete the course.     If you have worsening pain, redness, or rash come back to the ED.     Do not take Mobic or ibuprofen unless you are staying well hydrated and eating it with food. Too much of this can effect your kidneys.     Continue clotrimazole cream to your feet twice a day for the next 3 to 4 weeks    You were sent with Protonix once daily in the AM. This will help with any acid reflux or stomach pain you might have. You can see the family doctor to see if they want to continue this.

## 2025-07-20 NOTE — DISCHARGE SUMMARY
Discharge Summary - Hospitalist   Name: Gerdys Antonio ReyesCuevas 55 y.o. male I MRN: [id number]  Unit/Bed#: [place] [address] -01 I Date of Admission: 7/16/2025   Date of Service: 7/20/2025 I Hospital Day: 4     Assessment & Plan  Cellulitis of right lower extremity  Presenting with worsening RLE pain, fevers and chills..  Started with right inguinal pain, then progressive erythema.  No trauma, history of skin infection or diabetic wounds.  CT of the lower leg with circumferential subcutaneous edema and skin thickening, no soft tissue gas.  Right inguinal adenopathy which is felt to be reactive  Venous duplex negative  Appreciate infectious disease evaluation  Suspected with streptococcal infection due to streaking erythema and right inguinal pain -significantly has improved.  Remains afebrile over 24 hours without bacteremia at 72 hours  Transition off IV cefazolin over to oral cefadroxil 1 g twice daily through 7/25/2025  Discussed red flag symptoms and completion of antibiotic therapy with patient bedside  Type 2 diabetes mellitus without complication, without long-term current use of insulin ([facility])  Lab Results   Component Value Date    HGBA1C 8.0 (H) 07/17/2025     No noted gap or uncontrolled hyperglycemia with infection at this time  Resume metformin and monitor sugars.  Follow-up PCP to ensure adequate blood sugar control to reduce ongoing risk of infections.  Discussed with patient  Epigastric pain  This has improved with supportive care, IV fluids, antibiotics, PPI therapy  Suspected to be in setting of infection versus gastroenteritis/GERD  No difficulties swallowing, tolerating diet, abdominal pain, associated diarrhea, dark bloody stools  Discussed with patient to be cautious when using NSAIDs at home.  Continue PPI discharge and follow-up outpatient with PCP  Class 1 obesity with serious comorbidity and body mass index (BMI) of 31.0 to 31.9 in adult  Noted contributes all aspects of care  Enlarged lymph  nodes  Noted on CT lower extremity and abdominal imaging  Discussed with patient bedside metastasis cannot be ruled out although this is more likely felt to be reactive in setting of streptococcal cellulitis  Patient denies any recent unsolicited weight loss or poor appetite  Did encourage him for outpatient PCP follow-up so he can get reimaged and ensure he has all age-appropriate malignancy screenings.  Also placed his information on his discharge paperwork  Hypomagnesemia  In setting of recent GI loss which has improved and resolved, normalized    Tinea pedis  Noted on bilateral feet, continue clotrimazole cream twice daily for 4 weeks  OP PCP follow up  Discussed treatment with patient and that this is a risk factor for cellulitis     Medical Problems       Resolved Problems  Date Reviewed: 7/16/2025          Resolved    Thrombocytopenia (HCC) 7/18/2025     Resolved by  Turner Clark PA-C    LISA (acute kidney injury) (HCC) 7/19/2025     Resolved by  Turner Clark PA-C        Discharging Physician / Practitioner: Turner Clark PA-C  PCP: No primary care provider on file.  Admission Date:   Admission Orders (From admission, onward)       Ordered        07/16/25 1953  INPATIENT ADMISSION  Once                          Discharge Date: 07/20/25    Next Steps for Physician/AP Assuming Care:  Outpatient PCP follow-up  Discussed with patient he is recommended for 3-month CT follow-up of abdomen pelvis which he expressed understanding.    Medication Changes for Discharge & Rationale:   Nonspecific enlarged right external iliac nodes and incompletely visualized prominent right inguinal nodes. These are nonspecific and may just be reactive. Possibility of metastases is not excluded at this time. Correlate clinically. Recommend 3-month CT follow-up.  See after visit summary for reconciled discharge medications provided to patient and/or family.     Consultations During Hospital Stay:  Infectious Disease    Procedures  Performed:   None    Significant Findings / Test Results:   CT abdomen pelvis w contrast  Result Date: 7/18/2025  Impression: 1.  Nonspecific enlarged right external iliac nodes and incompletely visualized prominent right inguinal nodes. These are nonspecific and may just be reactive. Possibility of metastases is not excluded at this time. Correlate clinically. Recommend 3-month CT follow-up.     VAS lower limb venous duplex study, unilateral/limited  Result Date: 7/16/2025  CONCLUSION:  RIGHT LOWER LIMB There is no evidence of acute or chronic deep vein thrombosis. There is no evidence of superficial thrombophlebitis. Doppler evaluation shows a normal response to augmentation maneuvers. Popliteal, posterior tibial and anterior tibial arterial Doppler waveforms are triphasic. Tech Note: There is an echogenic non-vascularized structure located in the right inguinal region measuring approximately 3.47 cm which may be suggestive of enlarged lymphatic channels.    LEFT LOWER LIMB LIMITED There is no evidence of thrombus in the common femoral vein.  Doppler evaluation shows a normal response to augmentation maneuvers.       CT lower extremity w contrast right  Result Date: 7/16/2025  Impression: Circumferential subcutaneous edema and skin thickening of the right lower extremity extending from the knee through the ankle, which can be seen with cellulitis. No fluid collection. No soft tissue gas. Right inguinal adenopathy, which may be reactive.    Incidental Findings:  Nonspecific enlarged right external iliac nodes and incompletely visualized prominent right inguinal nodes. These are nonspecific and may just be reactive. Possibility of metastases is not excluded at this time. Correlate clinically. Recommend   3-month CT follow-up.  Utilized teach back method for patient with the above.  I reviewed the above mentioned incidental findings with the patient and/or family and they expressed understanding.    Hospital Course:    Gerdys Antonio ReyesCuevas is a 55 y.o. male patient who originally presented to the hospital on 7/16/2025 due to right lower extremity pain fevers and chills, as well as abdominal discomfort and nausea.  Patient was diagnosed with cellulitis of the right lower extremity and LISA on admission and started on IV fluids and antibiotics.  Due to significant streaking and lymphadenopathy infectious disease was consulted and recommended continuing IV antibiotics for streptococcal cellulitis.  Abscess, varicella-zoster and DVT were ruled out.  Due to epigastric pain patient had workup with CT abdomen which revealed nonspecific enlarged lymph nodes.  Patient's intermittent nausea and vomiting was felt to be related to GERD as he had improvement with Maalox versus infection as his symptoms improved with ongoing antibiotic therapy.  Patient remained without any bacteremia and was started on clotrimazole cream for his lower leg tinea pedis per the recommendations of infectious disease.  On day of discharge patient's erythema was almost completely resolved.  Inguinal streaking and tenderness has improved and patient has remained afebrile over 24 hours.  He is tolerating diet without any abdominal symptoms, diarrhea and is voiding well.  Patient's kidney function has remained stable and improved for over 48 hours.  He will be transitioned over to oral antibiotics to complete a 10-day course.  Due to nonspecific enlarged lymph nodes he is recommended for repeat CT with his PCP in [place] which was discussed in depth with him.  He is aware metastasis cannot be ruled out.     Discussed patient's discharge with infectious disease on call.    Please see above list of diagnoses and related plan for additional information.     Discharge Day Visit / Exam:   Subjective: Patient seen and examined lying in bed.  States all the pain in his leg has improved.  He is not having any pain anymore when he goes to walk.  He is tolerating a diet  "without any issues.  He denies any abdominal pain, nausea, vomiting.  He is eating and drinking okay.  Denies any fevers or chills.  He is ready to go home today.    Vitals: Blood Pressure: 130/82 (07/20/25 0817)  Pulse: 80 (07/20/25 0817)  Temperature: 98.5 °F (36.9 °C) (07/20/25 0817)  Temp Source: Oral (07/19/25 2120)  Respirations: 14 (07/20/25 0817)  Height: 5' 10\" (177.8 cm) (07/16/25 2100)  Weight - Scale: 100 kg (220 lb 7.4 oz) (07/16/25 2100)  SpO2: 95 % (07/20/25 0817)    Physical Exam  Vitals and nursing note reviewed.   Constitutional:       Appearance: Normal appearance. He is obese. He is not ill-appearing or diaphoretic.     Cardiovascular:      Rate and Rhythm: Normal rate and regular rhythm.   Pulmonary:      Effort: Pulmonary effort is normal. No respiratory distress.      Breath sounds: Normal breath sounds.   Abdominal:      General: Bowel sounds are normal.      Palpations: Abdomen is soft.      Tenderness: There is no abdominal tenderness.     Musculoskeletal:      Right lower leg: No edema.      Left lower leg: No edema.      Comments: RLE erythema almost completely resolved no vesicular lesions.  Right inguinal canal without any streaking tenderness to palpation     Skin:     General: Skin is warm and dry.     Neurological:      Mental Status: He is alert and oriented to person, place, and time. Mental status is at baseline.     Psychiatric:         Mood and Affect: Mood normal.         Behavior: Behavior normal.        Discussion with Family: Patient declined call to .     Discharge instructions/Information to patient and family:   See after visit summary for information provided to patient and family.      Provisions for Follow-Up Care:  See after visit summary for information related to follow-up care and any pertinent home health orders.      Mobility at time of Discharge:   Basic Mobility Inpatient Raw Score: 24  JH-HLM Goal: 8: Walk 250 feet or more  JH-HLM Achieved: 7: " Walk 25 feet or more  HLM Goal achieved. Continue to encourage appropriate mobility.     Disposition:   Home    Planned Readmission: No    Administrative Statements   Discharge Statement:  I have spent a total time of 30 minutes in caring for this patient on the day of the visit/encounter. .    **Please Note: This note may have been constructed using a voice recognition system**

## 2025-07-21 LAB
BACTERIA BLD CULT: NORMAL
BACTERIA BLD CULT: NORMAL